# Patient Record
Sex: MALE | Race: BLACK OR AFRICAN AMERICAN | URBAN - METROPOLITAN AREA
[De-identification: names, ages, dates, MRNs, and addresses within clinical notes are randomized per-mention and may not be internally consistent; named-entity substitution may affect disease eponyms.]

---

## 2018-05-21 VITALS
DIASTOLIC BLOOD PRESSURE: 75 MMHG | HEART RATE: 96 BPM | SYSTOLIC BLOOD PRESSURE: 121 MMHG | OXYGEN SATURATION: 95 % | TEMPERATURE: 98 F | HEIGHT: 71 IN | RESPIRATION RATE: 19 BRPM | WEIGHT: 212.08 LBS

## 2018-05-21 LAB
BASOPHILS NFR BLD AUTO: 0.2 % — SIGNIFICANT CHANGE UP (ref 0–2)
EOSINOPHIL NFR BLD AUTO: 2.9 % — SIGNIFICANT CHANGE UP (ref 0–6)
HCT VFR BLD CALC: 34.8 % — LOW (ref 39–50)
HGB BLD-MCNC: 11.3 G/DL — LOW (ref 13–17)
LYMPHOCYTES # BLD AUTO: 24.7 % — SIGNIFICANT CHANGE UP (ref 13–44)
MCHC RBC-ENTMCNC: 30.2 PG — SIGNIFICANT CHANGE UP (ref 27–34)
MCHC RBC-ENTMCNC: 32.5 G/DL — SIGNIFICANT CHANGE UP (ref 32–36)
MCV RBC AUTO: 93 FL — SIGNIFICANT CHANGE UP (ref 80–100)
MONOCYTES NFR BLD AUTO: 11.6 % — SIGNIFICANT CHANGE UP (ref 2–14)
NEUTROPHILS NFR BLD AUTO: 60.6 % — SIGNIFICANT CHANGE UP (ref 43–77)
PLATELET # BLD AUTO: 313 K/UL — SIGNIFICANT CHANGE UP (ref 150–400)
RBC # BLD: 3.74 M/UL — LOW (ref 4.2–5.8)
RBC # FLD: 14.1 % — SIGNIFICANT CHANGE UP (ref 10.3–16.9)
WBC # BLD: 8.4 K/UL — SIGNIFICANT CHANGE UP (ref 3.8–10.5)
WBC # FLD AUTO: 8.4 K/UL — SIGNIFICANT CHANGE UP (ref 3.8–10.5)

## 2018-05-21 PROCEDURE — 99285 EMERGENCY DEPT VISIT HI MDM: CPT

## 2018-05-22 ENCOUNTER — INPATIENT (INPATIENT)
Facility: HOSPITAL | Age: 73
LOS: 13 days | Discharge: NURSING, REHAB OR LONG-TERM CARE | DRG: 405 | End: 2018-06-05
Attending: SURGERY | Admitting: SURGERY
Payer: COMMERCIAL

## 2018-05-22 DIAGNOSIS — Z01.818 ENCOUNTER FOR OTHER PREPROCEDURAL EXAMINATION: ICD-10-CM

## 2018-05-22 DIAGNOSIS — Z21 ASYMPTOMATIC HUMAN IMMUNODEFICIENCY VIRUS [HIV] INFECTION STATUS: ICD-10-CM

## 2018-05-22 DIAGNOSIS — E78.5 HYPERLIPIDEMIA, UNSPECIFIED: ICD-10-CM

## 2018-05-22 DIAGNOSIS — K86.3 PSEUDOCYST OF PANCREAS: ICD-10-CM

## 2018-05-22 DIAGNOSIS — R79.1 ABNORMAL COAGULATION PROFILE: ICD-10-CM

## 2018-05-22 DIAGNOSIS — I10 ESSENTIAL (PRIMARY) HYPERTENSION: ICD-10-CM

## 2018-05-22 DIAGNOSIS — I50.22 CHRONIC SYSTOLIC (CONGESTIVE) HEART FAILURE: ICD-10-CM

## 2018-05-22 LAB
ALBUMIN SERPL ELPH-MCNC: 3 G/DL — LOW (ref 3.3–5)
ALBUMIN SERPL ELPH-MCNC: 3.4 G/DL — SIGNIFICANT CHANGE UP (ref 3.3–5)
ALP SERPL-CCNC: 80 U/L — SIGNIFICANT CHANGE UP (ref 40–120)
ALP SERPL-CCNC: SIGNIFICANT CHANGE UP U/L (ref 40–120)
ALT FLD-CCNC: 22 U/L — SIGNIFICANT CHANGE UP (ref 10–45)
ALT FLD-CCNC: SIGNIFICANT CHANGE UP U/L (ref 10–45)
ANION GAP SERPL CALC-SCNC: 13 MMOL/L — SIGNIFICANT CHANGE UP (ref 5–17)
ANION GAP SERPL CALC-SCNC: 13 MMOL/L — SIGNIFICANT CHANGE UP (ref 5–17)
APPEARANCE UR: CLEAR — SIGNIFICANT CHANGE UP
APTT BLD: 26.5 SEC — LOW (ref 27.5–37.4)
AST SERPL-CCNC: 22 U/L — SIGNIFICANT CHANGE UP (ref 10–40)
AST SERPL-CCNC: SIGNIFICANT CHANGE UP U/L (ref 10–40)
BACTERIA # UR AUTO: PRESENT /HPF
BILIRUB SERPL-MCNC: 0.4 MG/DL — SIGNIFICANT CHANGE UP (ref 0.2–1.2)
BILIRUB SERPL-MCNC: 0.4 MG/DL — SIGNIFICANT CHANGE UP (ref 0.2–1.2)
BILIRUB UR-MCNC: NEGATIVE — SIGNIFICANT CHANGE UP
BUN SERPL-MCNC: 14 MG/DL — SIGNIFICANT CHANGE UP (ref 7–23)
BUN SERPL-MCNC: 16 MG/DL — SIGNIFICANT CHANGE UP (ref 7–23)
CALCIUM SERPL-MCNC: 8.7 MG/DL — SIGNIFICANT CHANGE UP (ref 8.4–10.5)
CALCIUM SERPL-MCNC: 9.2 MG/DL — SIGNIFICANT CHANGE UP (ref 8.4–10.5)
CHLORIDE SERPL-SCNC: 92 MMOL/L — LOW (ref 96–108)
CHLORIDE SERPL-SCNC: 94 MMOL/L — LOW (ref 96–108)
CO2 SERPL-SCNC: 28 MMOL/L — SIGNIFICANT CHANGE UP (ref 22–31)
CO2 SERPL-SCNC: 28 MMOL/L — SIGNIFICANT CHANGE UP (ref 22–31)
COLOR SPEC: YELLOW — SIGNIFICANT CHANGE UP
CREAT SERPL-MCNC: 1.15 MG/DL — SIGNIFICANT CHANGE UP (ref 0.5–1.3)
CREAT SERPL-MCNC: 1.19 MG/DL — SIGNIFICANT CHANGE UP (ref 0.5–1.3)
DIFF PNL FLD: NEGATIVE — SIGNIFICANT CHANGE UP
GLUCOSE SERPL-MCNC: 100 MG/DL — HIGH (ref 70–99)
GLUCOSE SERPL-MCNC: 104 MG/DL — HIGH (ref 70–99)
GLUCOSE UR QL: NEGATIVE — SIGNIFICANT CHANGE UP
INR BLD: 1.38 — HIGH (ref 0.88–1.16)
KETONES UR-MCNC: NEGATIVE — SIGNIFICANT CHANGE UP
LEUKOCYTE ESTERASE UR-ACNC: NEGATIVE — SIGNIFICANT CHANGE UP
LIDOCAIN IGE QN: 203 U/L — HIGH (ref 7–60)
NITRITE UR-MCNC: NEGATIVE — SIGNIFICANT CHANGE UP
PH UR: 5.5 — SIGNIFICANT CHANGE UP (ref 5–8)
POTASSIUM SERPL-MCNC: 4.2 MMOL/L — SIGNIFICANT CHANGE UP (ref 3.5–5.3)
POTASSIUM SERPL-MCNC: SIGNIFICANT CHANGE UP MMOL/L (ref 3.5–5.3)
POTASSIUM SERPL-SCNC: 4.2 MMOL/L — SIGNIFICANT CHANGE UP (ref 3.5–5.3)
POTASSIUM SERPL-SCNC: SIGNIFICANT CHANGE UP MMOL/L (ref 3.5–5.3)
PROT SERPL-MCNC: 7.6 G/DL — SIGNIFICANT CHANGE UP (ref 6–8.3)
PROT SERPL-MCNC: 8.7 G/DL — HIGH (ref 6–8.3)
PROT UR-MCNC: (no result) MG/DL
PROTHROM AB SERPL-ACNC: 15.4 SEC — HIGH (ref 9.8–12.7)
RBC CASTS # UR COMP ASSIST: < 5 /HPF — SIGNIFICANT CHANGE UP
SODIUM SERPL-SCNC: 133 MMOL/L — LOW (ref 135–145)
SODIUM SERPL-SCNC: 135 MMOL/L — SIGNIFICANT CHANGE UP (ref 135–145)
SP GR SPEC: 1.01 — SIGNIFICANT CHANGE UP (ref 1–1.03)
UROBILINOGEN FLD QL: 1 E.U./DL — SIGNIFICANT CHANGE UP
WBC UR QL: < 5 /HPF — SIGNIFICANT CHANGE UP

## 2018-05-22 PROCEDURE — 99222 1ST HOSP IP/OBS MODERATE 55: CPT

## 2018-05-22 PROCEDURE — 93010 ELECTROCARDIOGRAM REPORT: CPT

## 2018-05-22 PROCEDURE — 71045 X-RAY EXAM CHEST 1 VIEW: CPT | Mod: 26

## 2018-05-22 PROCEDURE — 74177 CT ABD & PELVIS W/CONTRAST: CPT | Mod: 26

## 2018-05-22 RX ORDER — HEPARIN SODIUM 5000 [USP'U]/ML
5000 INJECTION INTRAVENOUS; SUBCUTANEOUS EVERY 8 HOURS
Qty: 0 | Refills: 0 | Status: DISCONTINUED | OUTPATIENT
Start: 2018-05-22 | End: 2018-05-23

## 2018-05-22 RX ORDER — LIPASE/PROTEASE/AMYLASE 16-48-48K
1 CAPSULE,DELAYED RELEASE (ENTERIC COATED) ORAL
Qty: 0 | Refills: 0 | COMMUNITY

## 2018-05-22 RX ORDER — DOCUSATE SODIUM 100 MG
3 CAPSULE ORAL
Qty: 0 | Refills: 0 | COMMUNITY

## 2018-05-22 RX ORDER — ONDANSETRON 8 MG/1
4 TABLET, FILM COATED ORAL ONCE
Qty: 0 | Refills: 0 | Status: COMPLETED | OUTPATIENT
Start: 2018-05-22 | End: 2018-05-22

## 2018-05-22 RX ORDER — LIPASE/PROTEASE/AMYLASE 16-48-48K
1 CAPSULE,DELAYED RELEASE (ENTERIC COATED) ORAL
Qty: 0 | Refills: 0 | Status: DISCONTINUED | OUTPATIENT
Start: 2018-05-22 | End: 2018-05-23

## 2018-05-22 RX ORDER — ONDANSETRON 8 MG/1
4 TABLET, FILM COATED ORAL EVERY 6 HOURS
Qty: 0 | Refills: 0 | Status: DISCONTINUED | OUTPATIENT
Start: 2018-05-22 | End: 2018-05-23

## 2018-05-22 RX ORDER — INFLUENZA VIRUS VACCINE 15; 15; 15; 15 UG/.5ML; UG/.5ML; UG/.5ML; UG/.5ML
0.5 SUSPENSION INTRAMUSCULAR ONCE
Qty: 0 | Refills: 0 | Status: COMPLETED | OUTPATIENT
Start: 2018-05-22 | End: 2018-05-22

## 2018-05-22 RX ORDER — GABAPENTIN 400 MG/1
300 CAPSULE ORAL THREE TIMES A DAY
Qty: 0 | Refills: 0 | Status: DISCONTINUED | OUTPATIENT
Start: 2018-05-23 | End: 2018-05-23

## 2018-05-22 RX ORDER — EMTRICITABINE, RILPIVIRINE HYDROCHLORIDE, AND TENOFOVIR DISOPROXIL FUMARATE 200; 25; 300 MG/1; MG/1; MG/1
1 TABLET, FILM COATED ORAL
Qty: 0 | Refills: 0 | COMMUNITY

## 2018-05-22 RX ORDER — SODIUM CHLORIDE 9 MG/ML
1000 INJECTION INTRAMUSCULAR; INTRAVENOUS; SUBCUTANEOUS ONCE
Qty: 0 | Refills: 0 | Status: COMPLETED | OUTPATIENT
Start: 2018-05-22 | End: 2018-05-22

## 2018-05-22 RX ORDER — HYDROMORPHONE HYDROCHLORIDE 2 MG/ML
0.25 INJECTION INTRAMUSCULAR; INTRAVENOUS; SUBCUTANEOUS EVERY 4 HOURS
Qty: 0 | Refills: 0 | Status: DISCONTINUED | OUTPATIENT
Start: 2018-05-22 | End: 2018-05-23

## 2018-05-22 RX ORDER — MORPHINE SULFATE 50 MG/1
4 CAPSULE, EXTENDED RELEASE ORAL ONCE
Qty: 0 | Refills: 0 | Status: DISCONTINUED | OUTPATIENT
Start: 2018-05-22 | End: 2018-05-22

## 2018-05-22 RX ORDER — IOHEXOL 300 MG/ML
50 INJECTION, SOLUTION INTRAVENOUS ONCE
Qty: 0 | Refills: 0 | Status: COMPLETED | OUTPATIENT
Start: 2018-05-22 | End: 2018-05-22

## 2018-05-22 RX ORDER — SODIUM CHLORIDE 9 MG/ML
1000 INJECTION INTRAMUSCULAR; INTRAVENOUS; SUBCUTANEOUS
Qty: 0 | Refills: 0 | Status: DISCONTINUED | OUTPATIENT
Start: 2018-05-22 | End: 2018-05-23

## 2018-05-22 RX ORDER — DOLUTEGRAVIR SODIUM 25 MG/1
1 TABLET, FILM COATED ORAL
Qty: 0 | Refills: 0 | COMMUNITY

## 2018-05-22 RX ORDER — EMTRICITABINE, RILPIVIRINE HYDROCHLORIDE, AND TENOFOVIR DISOPROXIL FUMARATE 200; 25; 300 MG/1; MG/1; MG/1
1 TABLET, FILM COATED ORAL DAILY
Qty: 0 | Refills: 0 | Status: DISCONTINUED | OUTPATIENT
Start: 2018-05-22 | End: 2018-05-23

## 2018-05-22 RX ORDER — ATORVASTATIN CALCIUM 80 MG/1
1 TABLET, FILM COATED ORAL
Qty: 0 | Refills: 0 | COMMUNITY

## 2018-05-22 RX ORDER — DOLUTEGRAVIR SODIUM 25 MG/1
50 TABLET, FILM COATED ORAL DAILY
Qty: 0 | Refills: 0 | Status: DISCONTINUED | OUTPATIENT
Start: 2018-05-22 | End: 2018-05-23

## 2018-05-22 RX ORDER — HYDROMORPHONE HYDROCHLORIDE 2 MG/ML
0.5 INJECTION INTRAMUSCULAR; INTRAVENOUS; SUBCUTANEOUS EVERY 4 HOURS
Qty: 0 | Refills: 0 | Status: DISCONTINUED | OUTPATIENT
Start: 2018-05-22 | End: 2018-05-23

## 2018-05-22 RX ORDER — DOLUTEGRAVIR SODIUM 25 MG/1
50 TABLET, FILM COATED ORAL DAILY
Qty: 0 | Refills: 0 | Status: DISCONTINUED | OUTPATIENT
Start: 2018-05-22 | End: 2018-05-22

## 2018-05-22 RX ADMIN — DOLUTEGRAVIR SODIUM 50 MILLIGRAM(S): 25 TABLET, FILM COATED ORAL at 11:17

## 2018-05-22 RX ADMIN — SODIUM CHLORIDE 125 MILLILITER(S): 9 INJECTION INTRAMUSCULAR; INTRAVENOUS; SUBCUTANEOUS at 05:58

## 2018-05-22 RX ADMIN — MORPHINE SULFATE 4 MILLIGRAM(S): 50 CAPSULE, EXTENDED RELEASE ORAL at 00:27

## 2018-05-22 RX ADMIN — IOHEXOL 50 MILLILITER(S): 300 INJECTION, SOLUTION INTRAVENOUS at 00:27

## 2018-05-22 RX ADMIN — HYDROMORPHONE HYDROCHLORIDE 0.5 MILLIGRAM(S): 2 INJECTION INTRAMUSCULAR; INTRAVENOUS; SUBCUTANEOUS at 12:15

## 2018-05-22 RX ADMIN — MORPHINE SULFATE 4 MILLIGRAM(S): 50 CAPSULE, EXTENDED RELEASE ORAL at 00:57

## 2018-05-22 RX ADMIN — HEPARIN SODIUM 5000 UNIT(S): 5000 INJECTION INTRAVENOUS; SUBCUTANEOUS at 11:15

## 2018-05-22 RX ADMIN — SODIUM CHLORIDE 175 MILLILITER(S): 9 INJECTION INTRAMUSCULAR; INTRAVENOUS; SUBCUTANEOUS at 21:50

## 2018-05-22 RX ADMIN — HYDROMORPHONE HYDROCHLORIDE 0.5 MILLIGRAM(S): 2 INJECTION INTRAMUSCULAR; INTRAVENOUS; SUBCUTANEOUS at 11:17

## 2018-05-22 RX ADMIN — SODIUM CHLORIDE 1000 MILLILITER(S): 9 INJECTION INTRAMUSCULAR; INTRAVENOUS; SUBCUTANEOUS at 00:26

## 2018-05-22 RX ADMIN — HEPARIN SODIUM 5000 UNIT(S): 5000 INJECTION INTRAVENOUS; SUBCUTANEOUS at 21:50

## 2018-05-22 RX ADMIN — ONDANSETRON 4 MILLIGRAM(S): 8 TABLET, FILM COATED ORAL at 00:27

## 2018-05-22 RX ADMIN — SODIUM CHLORIDE 175 MILLILITER(S): 9 INJECTION INTRAMUSCULAR; INTRAVENOUS; SUBCUTANEOUS at 11:11

## 2018-05-22 RX ADMIN — HYDROMORPHONE HYDROCHLORIDE 0.5 MILLIGRAM(S): 2 INJECTION INTRAMUSCULAR; INTRAVENOUS; SUBCUTANEOUS at 18:21

## 2018-05-22 RX ADMIN — EMTRICITABINE, RILPIVIRINE HYDROCHLORIDE, AND TENOFOVIR DISOPROXIL FUMARATE 1 TABLET(S): 200; 25; 300 TABLET, FILM COATED ORAL at 12:57

## 2018-05-22 RX ADMIN — Medication 1 CAPSULE(S): at 12:56

## 2018-05-22 NOTE — ED ADULT NURSE REASSESSMENT NOTE - NS ED NURSE REASSESS COMMENT FT1
Received pt from Gurpreet MCLEOD. Pending surgery consult. Pt reports lower back pain at this time, sleeping/resting presently. Will continue to monitor.

## 2018-05-22 NOTE — CONSULT NOTE ADULT - SUBJECTIVE AND OBJECTIVE BOX
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 	Tivicay 50 mg oral tablet: 1 tab(s) orally once a day      Past Medical History: HIV, chronic pancreatitis with insufficiency, HTN, HLD, bilateral cataracts    Surgical History: surgery for lumbar spinal stenosis, endoscopic and surgical cystgastrostomy    Family History: no pertinent family history     Social History: social etoh use, denies drugs of abuse and denies current smoking history, prior 20 pack year smoking history     REVIEW OF SYSTEMS:  CONSTITUTIONAL: No fever, weight loss,   EYES: No eye pain, visual disturbances, or discharge  ENMT:  No difficulty hearing, tinnitus, vertigo; No sinus or throat pain  NECK: No pain or stiffness  RESPIRATORY: as per HPI  CARDIOVASCULAR: No chest pain, palpitations, dizziness, or leg swelling  GASTROINTESTINAL: 7/10 dull abdominal pain  GENITOURINARY: No dysuria, frequency, hematuria, or incontinence  NEUROLOGICAL: No headaches, memory loss, loss of strength, numbness, or tremors  SKIN: No itching, burning, rashes, or lesions   LYMPH NODES: No enlarged glands  ENDOCRINE: No heat or cold intolerance; No hair loss  MUSCULOSKELETAL: No joint pain or swelling; No muscle, back, or extremity pain  PSYCHIATRIC: No depression, anxiety, mood swings, or difficulty sleeping  HEME/LYMPH: No easy bruising, or bleeding gums  ALLERY AND IMMUNOLOGIC: No hives or eczema    T(C): 36.6 (18 @ 10:34), Max: 36.9 (18 @ 09:39)  HR: 74 (18 @ 10:34) (61 - 96)  BP: 126/71 (18 @ 10:34) (106/62 - 134/74)  RR: 17 (18 @ 10:34) (17 - 19)  SpO2: 96% (18 @ 10:34) (95% - 96%)  Wt(kg): --Vital Signs Last 24 Hrs  T(C): 36.6 (22 May 2018 10:34), Max: 36.9 (22 May 2018 09:39)  T(F): 97.8 (22 May 2018 10:34), Max: 98.4 (22 May 2018 09:39)  HR: 74 (22 May 2018 10:34) (61 - 96)  BP: 126/71 (22 May 2018 10:34) (106/62 - 134/74)  BP(mean): --  RR: 17 (22 May 2018 10:34) (17 - 19)  SpO2: 96% (22 May 2018 10:34) (95% - 96%)    PHYSICAL EXAM:  GENERAL: NAD, well-groomed, well-developed, resting comfortably in bed  HEAD:  Atraumatic, Normocephalic  EYES: EOMI, PERRLA, conjunctiva and sclera clear  ENMT: No tonsillar erythema, exudates, or enlargement; Moist mucous membranes, Good dentition, No lesions  NECK: Supple, No JVD,   NERVOUS SYSTEM:  Alert & Oriented X3, Good concentration; Motor Strength 5/5 B/L upper and lower extremities;    CHEST/LUNG: Clear to percussion bilaterally; No rales, rhonchi, wheezing, or rubs  HEART: Regular rate and rhythm; No murmurs, rubs, or gallops  ABDOMEN: Soft, Nontender, Nondistended; tenderness to palpation along the upper and lower right quadrants, non-tender left upper and lower quandrants   EXTREMITIES:  2+ Peripheral Pulses, compression socks in place - no LE edema  LYMPH: No lymphadenopathy noted  SKIN: No rashes or lesions    Consultant(s) Notes Reviewed:  [x ] YES  [ ] NO  Care Discussed with Consultants/Other Providers [ x] YES  [ ] NO    LABS:                        11.3   8.4   )-----------( 313      ( 21 May 2018 23:31 )             34.8     05-    135  |  94<L>  |  14  ----------------------------<  100<H>  4.2   |  28  |  1.19    Ca    8.7      22 May 2018 04:20    TPro  7.6  /  Alb  3.0<L>  /  TBili  0.4  /  DBili  x   /  AST  22  /  ALT  22  /  AlkPhos  80  05-    PT/INR - ( 21 May 2018 23:31 )   PT: 15.4 sec;   INR: 1.38          PTT - ( 21 May 2018 23:31 )  PTT:26.5 sec  Urinalysis Basic - ( 22 May 2018 01:34 )    Color: Yellow / Appearance: Clear / S.015 / pH: x  Gluc: x / Ketone: NEGATIVE  / Bili: Negative / Urobili: 1.0 E.U./dL   Blood: x / Protein: Trace mg/dL / Nitrite: NEGATIVE   Leuk Esterase: NEGATIVE / RBC: < 5 /HPF / WBC < 5 /HPF   Sq Epi: x / Non Sq Epi: x / Bacteria: Present /HPF      CAPILLARY BLOOD GLUCOSE    Urinalysis Basic - ( 22 May 2018 01:34 )    Color: Yellow / Appearance: Clear / S.015 / pH: x  Gluc: x / Ketone: NEGATIVE  / Bili: Negative / Urobili: 1.0 E.U./dL   Blood: x / Protein: Trace mg/dL / Nitrite: NEGATIVE   Leuk Esterase: NEGATIVE / RBC: < 5 /HPF / WBC < 5 /HPF   Sq Epi: x / Non Sq Epi: x / Bacteria: Present /HPF    RADIOLOGY & ADDITIONAL TESTS:    Imaging Personally Reviewed:  [X ] YES  [ ] NO
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
## 2018-05-22 NOTE — CONSULT NOTE ADULT - PROBLEM SELECTOR RECOMMENDATION 2
unclear etiology, possibly vitamin K deficiency  -recommend mixing studies and repeat INR/PT in the am unclear etiology, possibly vitamin K deficiency vs factor deficiency  -recommend mixing studies and repeat INR/PT in the am

## 2018-05-22 NOTE — CONSULT NOTE ADULT - ATTENDING COMMENTS
Pt seen and examined, VS reviewed, agree with above assessment and plan as per Dr Alexander and as d/w pt and surgery team
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
## 2018-05-22 NOTE — ED ADULT NURSE NOTE - PMH
Asymptomatic human immunodeficiency virus infection  HIV (human immunodeficiency virus infection)  Cataract  Cataracts, bilateral  Chronic pancreatitis  Chronic pancreatitis

## 2018-05-22 NOTE — ED PROVIDER NOTE - MEDICAL DECISION MAKING DETAILS
pt with abd pain, nausea, hx of panceatitis, pe - (+) left upper quad tenderness, labs show elevated lipase, ct with pancreatitis with pseudocyst larger than prior, seen by surgery for possible drainage - pending dispo - waiting for Dr. Starr to see pt in ED.

## 2018-05-22 NOTE — CONSULT NOTE ADULT - PROBLEM SELECTOR RECOMMENDATION 5
-would c/w home statin dose - pt may need a high dose statin based on his cardiac history but would recommend out pt follow up unless obtain lipid profile as part of additional work up -would c/w home statin dose - pt may need a high dose statin based on his cardiac history  - can obtain fasting lipid profile as part of additional work up

## 2018-05-22 NOTE — H&P ADULT - NSHPPHYSICALEXAM_GEN_ALL_CORE
General: NAD, resting comfortably  HEENT: NC/AT, EOMI, normal hearing, no oral lesions, no LAD, neck supple  Pulmonary: normal resp effort, CTA-B  Cardiovascular: NSR, no murmurs  Abdominal: soft, ND, TTP LUQ, minimally tender in LLQ, no organomegaly, +BS  Back: Left CVA tenderness, vertebrae midline, negative straight leg raise test  Extremities: WWP, normal strength, no clubbing/cyanosis, trace non-pitting edema bilaterally in lower legs  Neuro: A/O x 3, CNs II-XII grossly intact, normal sensation, no focal deficits

## 2018-05-22 NOTE — CONSULT NOTE ADULT - ASSESSMENT
73 yo M with pmhx of HIV (last CD4 651, viral load undetectable), sCHF w/EF of 38%, PVD, HTN and chronic pancreatitis with pancreatic insufficiency who presented with 2 weeks of worsening sharp left back pain radiating to LUQ abdominal pain admitted for enlarging pancreatic pseudocyst medicine consult called for preoperative clearance 73 yo M with pmhx of HIV (last CD4 651, viral load undetectable), sCHF w/EF of 38%, PVD, HTN and chronic pancreatitis with pancreatic insufficiency who presented with 2 weeks of worsening sharp left back pain radiating to LUQ abdominal pain admitted for enlarging pancreatic pseudocyst and possible surgical resection, medicine consult called for preoperative clearance.

## 2018-05-22 NOTE — ED ADULT NURSE NOTE - CHIEF COMPLAINT QUOTE
Pt seen at [facility] sent for R/O PANCREATITIS after 2 wks LEFT SIDED ABD PN

## 2018-05-22 NOTE — CONSULT NOTE ADULT - PROBLEM SELECTOR RECOMMENDATION 6
-normotensive off of medications, takes HTZ at home, would start HF medications after surgery as above *would not start these medications preoperatively* -normotensive off of home medications  will make further BP med recs post op based on echo results
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
## 2018-05-22 NOTE — CONSULT NOTE ADULT - PROBLEM SELECTOR RECOMMENDATION 7
stable, pt is compliant with medications as outpt   -c/w Complera and Tivicay  -plan for outpt follow up    Medicine will continue to follow stable, pt is compliant with medications as outpt   -c/w Complera and Tivicay  given lack of documented viral load within the past year would obtain CD4 count and viral load prior to surgery  -would obtain collateral from Dr. Padilla who manages the patient's HIV  -plan for outpt follow up    Medicine will continue to follow

## 2018-05-22 NOTE — ED ADULT NURSE NOTE - OBJECTIVE STATEMENT
Pt presents to ED C/O LLQ abd pain x 2 weeks, worsening today. Pt also c/o nausea without vomiting. Last BM 4 days ago.

## 2018-05-22 NOTE — CONSULT NOTE ADULT - PROBLEM SELECTOR RECOMMENDATION 3
would repeat echocardiogram as above  -given complaints of LE edema, would consider lasix as outpt (pt reports taking HTZ at home), currently normotensive and w/o edema off of HTZ  -recommend discharge plan to start HF medications, low dose BB and ACE, NYHA class III given severe exercise intolerance  -would repeat echocardiogram as above and obtain collateral from patient's PCP  -given complaints of LE edema, would consider lasix as outpt given CHF history (pt reports taking HTZ at home), currently normotensive and w/o edema off of HTZ  -recommend discharge plan to start HF medications, low dose BB and ACE as BP tolerated w/plan for outpt follow up NYHA class III given severe exercise intolerance  -would repeat echocardiogram as above and obtain collateral from patient's PCP  -given complaints of LE edema, would consider lasix as outpt given CHF history (pt reports taking HTZ at home), currently normotensive and w/o edema off of HTZ  -recommend discharge plan to start CHF medications, low dose BB and ACE as BP tolerated w/plan for outpt follow up- will make further recs s/p echo

## 2018-05-23 ENCOUNTER — RESULT REVIEW (OUTPATIENT)
Age: 73
End: 2018-05-23

## 2018-05-23 LAB
ALBUMIN SERPL ELPH-MCNC: 3.1 G/DL — LOW (ref 3.3–5)
ALP SERPL-CCNC: 82 U/L — SIGNIFICANT CHANGE UP (ref 40–120)
ALT FLD-CCNC: 25 U/L — SIGNIFICANT CHANGE UP (ref 10–45)
ANION GAP SERPL CALC-SCNC: 10 MMOL/L — SIGNIFICANT CHANGE UP (ref 5–17)
ANION GAP SERPL CALC-SCNC: 18 MMOL/L — HIGH (ref 5–17)
APTT BLD: 26.1 SEC — LOW (ref 27.5–37.4)
AST SERPL-CCNC: 27 U/L — SIGNIFICANT CHANGE UP (ref 10–40)
BASE EXCESS BLDA CALC-SCNC: -2.2 MMOL/L — LOW (ref -2–3)
BASOPHILS NFR BLD AUTO: 0.1 % — SIGNIFICANT CHANGE UP (ref 0–2)
BILIRUB SERPL-MCNC: 0.3 MG/DL — SIGNIFICANT CHANGE UP (ref 0.2–1.2)
BUN SERPL-MCNC: 12 MG/DL — SIGNIFICANT CHANGE UP (ref 7–23)
BUN SERPL-MCNC: 13 MG/DL — SIGNIFICANT CHANGE UP (ref 7–23)
CA-I BLDA-SCNC: 0.98 MMOL/L — LOW (ref 1.12–1.3)
CALCIUM SERPL-MCNC: 8.3 MG/DL — LOW (ref 8.4–10.5)
CALCIUM SERPL-MCNC: 8.7 MG/DL — SIGNIFICANT CHANGE UP (ref 8.4–10.5)
CHLORIDE SERPL-SCNC: 102 MMOL/L — SIGNIFICANT CHANGE UP (ref 96–108)
CHLORIDE SERPL-SCNC: 103 MMOL/L — SIGNIFICANT CHANGE UP (ref 96–108)
CO2 SERPL-SCNC: 20 MMOL/L — LOW (ref 22–31)
CO2 SERPL-SCNC: 25 MMOL/L — SIGNIFICANT CHANGE UP (ref 22–31)
COHGB MFR BLDA: 0.2 % — SIGNIFICANT CHANGE UP
CREAT SERPL-MCNC: 1 MG/DL — SIGNIFICANT CHANGE UP (ref 0.5–1.3)
CREAT SERPL-MCNC: 1.09 MG/DL — SIGNIFICANT CHANGE UP (ref 0.5–1.3)
CULTURE RESULTS: NO GROWTH — SIGNIFICANT CHANGE UP
EOSINOPHIL NFR BLD AUTO: 0.1 % — SIGNIFICANT CHANGE UP (ref 0–6)
FIBRINOGEN PPP-MCNC: 1092 MG/DL — HIGH (ref 310–510)
GAS PNL BLDA: SIGNIFICANT CHANGE UP
GLUCOSE BLDC GLUCOMTR-MCNC: 114 MG/DL — HIGH (ref 70–99)
GLUCOSE SERPL-MCNC: 115 MG/DL — HIGH (ref 70–99)
GLUCOSE SERPL-MCNC: 153 MG/DL — HIGH (ref 70–99)
HCO3 BLDA-SCNC: 23 MMOL/L — SIGNIFICANT CHANGE UP (ref 21–28)
HCT VFR BLD CALC: 29 % — LOW (ref 39–50)
HCT VFR BLD CALC: 32.4 % — LOW (ref 39–50)
HGB BLD-MCNC: 10.3 G/DL — LOW (ref 13–17)
HGB BLD-MCNC: 9.3 G/DL — LOW (ref 13–17)
HGB BLDA-MCNC: 12.2 G/DL — LOW (ref 13–17)
HIV-1 VIRAL LOAD RESULT: SIGNIFICANT CHANGE UP
HIV1 RNA # SERPL NAA+PROBE: SIGNIFICANT CHANGE UP
HIV1 RNA SER-IMP: SIGNIFICANT CHANGE UP
HIV1 RNA SERPL NAA+PROBE-ACNC: SIGNIFICANT CHANGE UP
HIV1 RNA SERPL NAA+PROBE-LOG#: SIGNIFICANT CHANGE UP LG COP/ML
INR BLD: 1.3 — HIGH (ref 0.88–1.16)
LIDOCAIN IGE QN: 188 U/L — HIGH (ref 7–60)
LYMPHOCYTES # BLD AUTO: 6.4 % — LOW (ref 13–44)
MAGNESIUM SERPL-MCNC: 1.9 MG/DL — SIGNIFICANT CHANGE UP (ref 1.6–2.6)
MAGNESIUM SERPL-MCNC: 2.3 MG/DL — SIGNIFICANT CHANGE UP (ref 1.6–2.6)
MCHC RBC-ENTMCNC: 30.2 PG — SIGNIFICANT CHANGE UP (ref 27–34)
MCHC RBC-ENTMCNC: 30.2 PG — SIGNIFICANT CHANGE UP (ref 27–34)
MCHC RBC-ENTMCNC: 31.8 G/DL — LOW (ref 32–36)
MCHC RBC-ENTMCNC: 32.1 G/DL — SIGNIFICANT CHANGE UP (ref 32–36)
MCV RBC AUTO: 94.2 FL — SIGNIFICANT CHANGE UP (ref 80–100)
MCV RBC AUTO: 95 FL — SIGNIFICANT CHANGE UP (ref 80–100)
METHGB MFR BLDA: 0.4 % — SIGNIFICANT CHANGE UP
MONOCYTES NFR BLD AUTO: 8.5 % — SIGNIFICANT CHANGE UP (ref 2–14)
NEUTROPHILS NFR BLD AUTO: 84.9 % — HIGH (ref 43–77)
O2 CT VFR BLDA CALC: 17.4 ML/DL — SIGNIFICANT CHANGE UP (ref 15–23)
OXYHGB MFR BLDA: 99 % — SIGNIFICANT CHANGE UP (ref 94–100)
PCO2 BLDA: 40 MMHG — SIGNIFICANT CHANGE UP (ref 35–48)
PH BLDA: 7.38 — SIGNIFICANT CHANGE UP (ref 7.35–7.45)
PHOSPHATE SERPL-MCNC: 2.7 MG/DL — SIGNIFICANT CHANGE UP (ref 2.5–4.5)
PHOSPHATE SERPL-MCNC: 3.5 MG/DL — SIGNIFICANT CHANGE UP (ref 2.5–4.5)
PLATELET # BLD AUTO: 293 K/UL — SIGNIFICANT CHANGE UP (ref 150–400)
PLATELET # BLD AUTO: 306 K/UL — SIGNIFICANT CHANGE UP (ref 150–400)
PO2 BLDA: 198 MMHG — HIGH (ref 83–108)
POTASSIUM BLDA-SCNC: 4.1 MMOL/L — SIGNIFICANT CHANGE UP (ref 3.5–4.9)
POTASSIUM SERPL-MCNC: 4.3 MMOL/L — SIGNIFICANT CHANGE UP (ref 3.5–5.3)
POTASSIUM SERPL-MCNC: 4.7 MMOL/L — SIGNIFICANT CHANGE UP (ref 3.5–5.3)
POTASSIUM SERPL-SCNC: 4.3 MMOL/L — SIGNIFICANT CHANGE UP (ref 3.5–5.3)
POTASSIUM SERPL-SCNC: 4.7 MMOL/L — SIGNIFICANT CHANGE UP (ref 3.5–5.3)
PROT SERPL-MCNC: 7.5 G/DL — SIGNIFICANT CHANGE UP (ref 6–8.3)
PROTHROM AB SERPL-ACNC: 14.5 SEC — HIGH (ref 9.8–12.7)
PT 50/50: 12.3 SECS — SIGNIFICANT CHANGE UP (ref 9.7–13.5)
RBC # BLD: 3.08 M/UL — LOW (ref 4.2–5.8)
RBC # BLD: 3.41 M/UL — LOW (ref 4.2–5.8)
RBC # FLD: 14 % — SIGNIFICANT CHANGE UP (ref 10.3–16.9)
RBC # FLD: 14.1 % — SIGNIFICANT CHANGE UP (ref 10.3–16.9)
SAO2 % BLDA: 99 % — SIGNIFICANT CHANGE UP (ref 95–100)
SODIUM BLDA-SCNC: 136 MMOL/L — LOW (ref 138–146)
SODIUM SERPL-SCNC: 138 MMOL/L — SIGNIFICANT CHANGE UP (ref 135–145)
SODIUM SERPL-SCNC: 140 MMOL/L — SIGNIFICANT CHANGE UP (ref 135–145)
SPECIMEN SOURCE: SIGNIFICANT CHANGE UP
THROMBIN TIME: 19.3 SEC — SIGNIFICANT CHANGE UP (ref 17.6–24)
WBC # BLD: 10.4 K/UL — SIGNIFICANT CHANGE UP (ref 3.8–10.5)
WBC # BLD: 6 K/UL — SIGNIFICANT CHANGE UP (ref 3.8–10.5)
WBC # FLD AUTO: 10.4 K/UL — SIGNIFICANT CHANGE UP (ref 3.8–10.5)
WBC # FLD AUTO: 6 K/UL — SIGNIFICANT CHANGE UP (ref 3.8–10.5)

## 2018-05-23 PROCEDURE — 93306 TTE W/DOPPLER COMPLETE: CPT | Mod: 26

## 2018-05-23 PROCEDURE — 99233 SBSQ HOSP IP/OBS HIGH 50: CPT

## 2018-05-23 PROCEDURE — 71045 X-RAY EXAM CHEST 1 VIEW: CPT | Mod: 26

## 2018-05-23 RX ORDER — ACETAMINOPHEN 500 MG
1000 TABLET ORAL ONCE
Qty: 0 | Refills: 0 | Status: COMPLETED | OUTPATIENT
Start: 2018-05-23 | End: 2018-05-23

## 2018-05-23 RX ORDER — HEPARIN SODIUM 5000 [USP'U]/ML
5000 INJECTION INTRAVENOUS; SUBCUTANEOUS EVERY 8 HOURS
Qty: 0 | Refills: 0 | Status: DISCONTINUED | OUTPATIENT
Start: 2018-05-23 | End: 2018-06-05

## 2018-05-23 RX ORDER — ONDANSETRON 8 MG/1
4 TABLET, FILM COATED ORAL EVERY 6 HOURS
Qty: 0 | Refills: 0 | Status: DISCONTINUED | OUTPATIENT
Start: 2018-05-23 | End: 2018-06-05

## 2018-05-23 RX ORDER — BUPIVACAINE 13.3 MG/ML
20 INJECTION, SUSPENSION, LIPOSOMAL INFILTRATION ONCE
Qty: 0 | Refills: 0 | Status: DISCONTINUED | OUTPATIENT
Start: 2018-05-23 | End: 2018-05-23

## 2018-05-23 RX ORDER — ALBUMIN HUMAN 25 %
1000 VIAL (ML) INTRAVENOUS ONCE
Qty: 0 | Refills: 0 | Status: DISCONTINUED | OUTPATIENT
Start: 2018-05-23 | End: 2018-06-05

## 2018-05-23 RX ORDER — SODIUM CHLORIDE 9 MG/ML
1000 INJECTION, SOLUTION INTRAVENOUS
Qty: 0 | Refills: 0 | Status: DISCONTINUED | OUTPATIENT
Start: 2018-05-23 | End: 2018-05-30

## 2018-05-23 RX ORDER — HYDROMORPHONE HYDROCHLORIDE 2 MG/ML
0.5 INJECTION INTRAMUSCULAR; INTRAVENOUS; SUBCUTANEOUS EVERY 4 HOURS
Qty: 0 | Refills: 0 | Status: DISCONTINUED | OUTPATIENT
Start: 2018-05-23 | End: 2018-05-30

## 2018-05-23 RX ORDER — MAGNESIUM SULFATE 500 MG/ML
2 VIAL (ML) INJECTION ONCE
Qty: 0 | Refills: 0 | Status: COMPLETED | OUTPATIENT
Start: 2018-05-23 | End: 2018-05-23

## 2018-05-23 RX ORDER — HYDROMORPHONE HYDROCHLORIDE 2 MG/ML
1 INJECTION INTRAMUSCULAR; INTRAVENOUS; SUBCUTANEOUS EVERY 4 HOURS
Qty: 0 | Refills: 0 | Status: DISCONTINUED | OUTPATIENT
Start: 2018-05-23 | End: 2018-05-25

## 2018-05-23 RX ORDER — ALBUMIN HUMAN 25 %
1000 VIAL (ML) INTRAVENOUS ONCE
Qty: 0 | Refills: 0 | Status: DISCONTINUED | OUTPATIENT
Start: 2018-05-23 | End: 2018-05-23

## 2018-05-23 RX ADMIN — HYDROMORPHONE HYDROCHLORIDE 0.5 MILLIGRAM(S): 2 INJECTION INTRAMUSCULAR; INTRAVENOUS; SUBCUTANEOUS at 00:00

## 2018-05-23 RX ADMIN — SODIUM CHLORIDE 175 MILLILITER(S): 9 INJECTION INTRAMUSCULAR; INTRAVENOUS; SUBCUTANEOUS at 09:50

## 2018-05-23 RX ADMIN — HYDROMORPHONE HYDROCHLORIDE 1 MILLIGRAM(S): 2 INJECTION INTRAMUSCULAR; INTRAVENOUS; SUBCUTANEOUS at 18:24

## 2018-05-23 RX ADMIN — Medication 50 GRAM(S): at 20:39

## 2018-05-23 RX ADMIN — HYDROMORPHONE HYDROCHLORIDE 0.5 MILLIGRAM(S): 2 INJECTION INTRAMUSCULAR; INTRAVENOUS; SUBCUTANEOUS at 00:15

## 2018-05-23 RX ADMIN — HYDROMORPHONE HYDROCHLORIDE 0.5 MILLIGRAM(S): 2 INJECTION INTRAMUSCULAR; INTRAVENOUS; SUBCUTANEOUS at 22:48

## 2018-05-23 RX ADMIN — Medication 1000 MILLIGRAM(S): at 22:48

## 2018-05-23 RX ADMIN — Medication 400 MILLIGRAM(S): at 20:30

## 2018-05-23 RX ADMIN — HYDROMORPHONE HYDROCHLORIDE 0.5 MILLIGRAM(S): 2 INJECTION INTRAMUSCULAR; INTRAVENOUS; SUBCUTANEOUS at 20:52

## 2018-05-23 RX ADMIN — HYDROMORPHONE HYDROCHLORIDE 0.5 MILLIGRAM(S): 2 INJECTION INTRAMUSCULAR; INTRAVENOUS; SUBCUTANEOUS at 04:28

## 2018-05-23 RX ADMIN — HYDROMORPHONE HYDROCHLORIDE 1 MILLIGRAM(S): 2 INJECTION INTRAMUSCULAR; INTRAVENOUS; SUBCUTANEOUS at 18:29

## 2018-05-23 RX ADMIN — GABAPENTIN 300 MILLIGRAM(S): 400 CAPSULE ORAL at 09:50

## 2018-05-23 RX ADMIN — HYDROMORPHONE HYDROCHLORIDE 0.5 MILLIGRAM(S): 2 INJECTION INTRAMUSCULAR; INTRAVENOUS; SUBCUTANEOUS at 00:45

## 2018-05-23 RX ADMIN — HEPARIN SODIUM 5000 UNIT(S): 5000 INJECTION INTRAVENOUS; SUBCUTANEOUS at 05:58

## 2018-05-23 RX ADMIN — HEPARIN SODIUM 5000 UNIT(S): 5000 INJECTION INTRAVENOUS; SUBCUTANEOUS at 22:47

## 2018-05-23 RX ADMIN — HYDROMORPHONE HYDROCHLORIDE 1 MILLIGRAM(S): 2 INJECTION INTRAMUSCULAR; INTRAVENOUS; SUBCUTANEOUS at 22:00

## 2018-05-23 RX ADMIN — HYDROMORPHONE HYDROCHLORIDE 1 MILLIGRAM(S): 2 INJECTION INTRAMUSCULAR; INTRAVENOUS; SUBCUTANEOUS at 22:48
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
## 2018-05-23 NOTE — CONSULT NOTE ADULT - ASSESSMENT
73 yo M with HIV (last CD4 651, viral load undetectable, compliant on HAART) and chronic pancreatitis who is preop for resection of pseudocyst. His RCRI score is 0 making him low risk for a high risk procedure, however the presence of inferior q waves is suggestive of a prior infarct at some point.     - No further preoperative testing is indicated  - Would resume HIV medications  - Continue supportive care  - Given the presence of Q waves, will need an ischemic evaluation as an outpatient.   - Discussed with Dr. Mounika Brandon MD  Cardiology Fellow   [phone]
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
## 2018-05-23 NOTE — BRIEF OPERATIVE NOTE - OPERATION/FINDINGS
Procedure: Exploratory laparotomy, lysis of adhesions, distal pancreatectomy, splenectomy and segmental colon resection with primary anastomosis.  creation of aditi-en-y with jejunal serosal patch of distal pancreas  Findings: Large pancreatic pseudocyst encasing splenic flexure of colon.  Distal pancreas, pancreatic pseudocyst and spleen removed en bloc.   Short segment aditi-en-y created with jejunal serosal patch of distal pancreas.  Single 21 F mj left at pancreatic resection bed

## 2018-05-23 NOTE — PROGRESS NOTE ADULT - SUBJECTIVE AND OBJECTIVE BOX
Team 1 Surgery Post-Op Note, PCN:     Pre-Op Dx: pancreatitis  Procedure: Exploratory laparotomy    Surgeon: Matty    Subjective: pt seen at bedside, complains of back pain. denies abdominal pain, nausea, and vomiting      Vital Signs Last 24 Hrs  T(C): 36.8 (23 May 2018 18:30), Max: 37.2 (23 May 2018 05:12)  T(F): 98.2 (23 May 2018 18:30), Max: 99 (23 May 2018 05:12)  HR: 73 (23 May 2018 20:00) (68 - 76)  BP: 140/63 (23 May 2018 20:00) (119/64 - 142/70)  BP(mean): 86 (23 May 2018 18:00) (81 - 89)  RR: 17 (23 May 2018 20:00) (11 - 24)  SpO2: 98% (23 May 2018 20:00) (96% - 100%)    Physical Exam:  General: NAD, resting comfortably in bed  Pulmonary: Nonlabored breathing, no respiratory distress  Cardiovascular: NSR  Abdominal: soft, NT/ND, incision with dressing, nonsaturated, VALERIA drain in place to self suction with minimal serosang fluid  Extremities: WWP, normal strength  Pulses: palpable distal pulses      LABS:                        9.3    10.4  )-----------( 293      ( 23 May 2018 18:35 )             29.0         140  |  102  |  12  ----------------------------<  153<H>  4.7   |  20<L>  |  1.00    Ca    8.3<L>      23 May 2018 18:31  Phos  3.5       Mg     1.9         TPro  7.5  /  Alb  3.1<L>  /  TBili  0.3  /  DBili  x   /  AST  27  /  ALT  25  /  AlkPhos  82  23    PT/INR - ( 23 May 2018 15:40 )   PT: 14.5 sec;   INR: 1.30          PTT - ( 23 May 2018 06:31 )  PTT:26.1 sec  CAPILLARY BLOOD GLUCOSE      POCT Blood Glucose.: 114 mg/dL (23 May 2018 14:24)    Urinalysis Basic - ( 22 May 2018 01:34 )    Color: Yellow / Appearance: Clear / S.015 / pH: x  Gluc: x / Ketone: NEGATIVE  / Bili: Negative / Urobili: 1.0 E.U./dL   Blood: x / Protein: Trace mg/dL / Nitrite: NEGATIVE   Leuk Esterase: NEGATIVE / RBC: < 5 /HPF / WBC < 5 /HPF   Sq Epi: x / Non Sq Epi: x / Bacteria: Present /HPF      LIVER FUNCTIONS - ( 23 May 2018 06:31 )  Alb: 3.1 g/dL / Pro: 7.5 g/dL / ALK PHOS: 82 U/L / ALT: 25 U/L / AST: 27 U/L / GGT: x                 Radiology and Additional Studies:    Assessment:72y Male s/p above procedure    Plan:  Pain/nausea control PRN  Home meds  Incentive spirometer/OOB/Ambulate  IVF, Diet: NPO/NGT  AM labs  Christensen

## 2018-05-23 NOTE — PROGRESS NOTE ADULT - SUBJECTIVE AND OBJECTIVE BOX
O/N: pt preopped for the OR tomorrow, ARTHUR  5/22: admitted for acute on chronic pancreatitis (with pancreatic insufficiency) and enlarging pancreatic pseudocyst. Medicine recommends echo and EKG. Cards consulted. O/N: pt preopped for the OR tomorrow, ARTHUR  : admitted for acute on chronic pancreatitis (with pancreatic insufficiency) and enlarging pancreatic pseudocyst. Medicine recommends echo and EKG. Cards consulted.       SUBJECTIVE: Patient seen and examined bedside by chief resident. OR today.         Vital Signs Last 24 Hrs  T(C): 36.5 (23 May 2018 09:47), Max: 37.2 (22 May 2018 18:32)  T(F): 97.7 (23 May 2018 09:47), Max: 99 (23 May 2018 05:12)  HR: 71 (23 May 2018 09:47) (68 - 73)  BP: 131/75 (23 May 2018 09:47) (110/65 - 131/79)  BP(mean): --  RR: 16 (23 May 2018 09:47) (16 - 18)  SpO2: 98% (23 May 2018 09:47) (96% - 98%)  I&O's Detail    22 May 2018 07:01  -  23 May 2018 07:00  --------------------------------------------------------  IN:    Oral Fluid: 360 mL    sodium chloride 0.9%: 1575 mL  Total IN: 1935 mL    OUT:  Total OUT: 0 mL    Total NET: 1935 mL          General: NAD, resting comfortably in bed  C/V: NSR  Pulm: Nonlabored breathing, no respiratory distress  Abd: soft, NT/ND.   Extrem: WWP, no edema, SCDs in place        LABS:                        10.3   6.0   )-----------( 306      ( 23 May 2018 06:31 )             32.4     05-    138  |  103  |  13  ----------------------------<  115<H>  4.3   |  25  |  1.09    Ca    8.7      23 May 2018 06:31  Phos  2.7     05  Mg     2.3         TPro  7.5  /  Alb  3.1<L>  /  TBili  0.3  /  DBili  x   /  AST  27  /  ALT  25  /  AlkPhos  82  05-23    PT/INR - ( 23 May 2018 15:40 )   PT: 14.5 sec;   INR: 1.30          PTT - ( 23 May 2018 06:31 )  PTT:26.1 sec  Urinalysis Basic - ( 22 May 2018 01:34 )    Color: Yellow / Appearance: Clear / S.015 / pH: x  Gluc: x / Ketone: NEGATIVE  / Bili: Negative / Urobili: 1.0 E.U./dL   Blood: x / Protein: Trace mg/dL / Nitrite: NEGATIVE   Leuk Esterase: NEGATIVE / RBC: < 5 /HPF / WBC < 5 /HPF   Sq Epi: x / Non Sq Epi: x / Bacteria: Present /HPF        RADIOLOGY & ADDITIONAL STUDIES:

## 2018-05-23 NOTE — BRIEF OPERATIVE NOTE - PROCEDURE POST
<<-----Click on this checkbox to enter Post-Op Dx no abdominal pain, no bloating, no constipation, no diarrhea, no nausea and no vomiting.

## 2018-05-24 DIAGNOSIS — E87.2 ACIDOSIS: ICD-10-CM

## 2018-05-24 LAB
4/8 RATIO: 0.35 RATIO — LOW (ref 0.9–3.6)
ABS CD8: 1152 /UL — HIGH (ref 136–757)
ANION GAP SERPL CALC-SCNC: 19 MMOL/L — HIGH (ref 5–17)
BUN SERPL-MCNC: 10 MG/DL — SIGNIFICANT CHANGE UP (ref 7–23)
CALCIUM SERPL-MCNC: 8.8 MG/DL — SIGNIFICANT CHANGE UP (ref 8.4–10.5)
CD16+CD56+ CELLS NFR BLD: 17 % — SIGNIFICANT CHANGE UP (ref 4–25)
CD16+CD56+ CELLS NFR SPEC: 357 /UL — SIGNIFICANT CHANGE UP (ref 64–494)
CD19 BLASTS SPEC-ACNC: 101 /UL — SIGNIFICANT CHANGE UP (ref 68–528)
CD19 BLASTS SPEC-ACNC: 5 % — SIGNIFICANT CHANGE UP (ref 5–22)
CD3 BLASTS SPEC-ACNC: 1567 /UL — SIGNIFICANT CHANGE UP (ref 799–2171)
CD3 BLASTS SPEC-ACNC: 77 % — SIGNIFICANT CHANGE UP (ref 59–85)
CD4 %: 20 % — LOW (ref 36–65)
CD8 %: 57 % — HIGH (ref 11–36)
CHLORIDE SERPL-SCNC: 102 MMOL/L — SIGNIFICANT CHANGE UP (ref 96–108)
CO2 SERPL-SCNC: 19 MMOL/L — LOW (ref 22–31)
CREAT SERPL-MCNC: 0.91 MG/DL — SIGNIFICANT CHANGE UP (ref 0.5–1.3)
GLUCOSE SERPL-MCNC: 151 MG/DL — HIGH (ref 70–99)
GRAM STN FLD: SIGNIFICANT CHANGE UP
HCT VFR BLD CALC: 32.1 % — LOW (ref 39–50)
HGB BLD-MCNC: 10.3 G/DL — LOW (ref 13–17)
MAGNESIUM SERPL-MCNC: 2.2 MG/DL — SIGNIFICANT CHANGE UP (ref 1.6–2.6)
MCHC RBC-ENTMCNC: 30 PG — SIGNIFICANT CHANGE UP (ref 27–34)
MCHC RBC-ENTMCNC: 32.1 G/DL — SIGNIFICANT CHANGE UP (ref 32–36)
MCV RBC AUTO: 93.6 FL — SIGNIFICANT CHANGE UP (ref 80–100)
PHOSPHATE SERPL-MCNC: 3.4 MG/DL — SIGNIFICANT CHANGE UP (ref 2.5–4.5)
PLATELET # BLD AUTO: 338 K/UL — SIGNIFICANT CHANGE UP (ref 150–400)
POTASSIUM SERPL-MCNC: 4.5 MMOL/L — SIGNIFICANT CHANGE UP (ref 3.5–5.3)
POTASSIUM SERPL-SCNC: 4.5 MMOL/L — SIGNIFICANT CHANGE UP (ref 3.5–5.3)
RBC # BLD: 3.43 M/UL — LOW (ref 4.2–5.8)
RBC # FLD: 14.1 % — SIGNIFICANT CHANGE UP (ref 10.3–16.9)
SODIUM SERPL-SCNC: 140 MMOL/L — SIGNIFICANT CHANGE UP (ref 135–145)
SPECIMEN SOURCE: SIGNIFICANT CHANGE UP
T-CELL CD4 SUBSET PNL BLD: 405 /UL — LOW (ref 489–1457)
WBC # BLD: 16 K/UL — HIGH (ref 3.8–10.5)
WBC # FLD AUTO: 16 K/UL — HIGH (ref 3.8–10.5)

## 2018-05-24 PROCEDURE — 99233 SBSQ HOSP IP/OBS HIGH 50: CPT

## 2018-05-24 RX ORDER — HYDROMORPHONE HYDROCHLORIDE 2 MG/ML
0.5 INJECTION INTRAMUSCULAR; INTRAVENOUS; SUBCUTANEOUS ONCE
Qty: 0 | Refills: 0 | Status: DISCONTINUED | OUTPATIENT
Start: 2018-05-24 | End: 2018-05-25

## 2018-05-24 RX ORDER — HYDROMORPHONE HYDROCHLORIDE 2 MG/ML
0.5 INJECTION INTRAMUSCULAR; INTRAVENOUS; SUBCUTANEOUS ONCE
Qty: 0 | Refills: 0 | Status: DISCONTINUED | OUTPATIENT
Start: 2018-05-24 | End: 2018-05-24

## 2018-05-24 RX ORDER — ACETAMINOPHEN 500 MG
1000 TABLET ORAL ONCE
Qty: 0 | Refills: 0 | Status: COMPLETED | OUTPATIENT
Start: 2018-05-24 | End: 2018-05-24

## 2018-05-24 RX ADMIN — HYDROMORPHONE HYDROCHLORIDE 1 MILLIGRAM(S): 2 INJECTION INTRAMUSCULAR; INTRAVENOUS; SUBCUTANEOUS at 18:09

## 2018-05-24 RX ADMIN — SODIUM CHLORIDE 125 MILLILITER(S): 9 INJECTION, SOLUTION INTRAVENOUS at 02:00

## 2018-05-24 RX ADMIN — HEPARIN SODIUM 5000 UNIT(S): 5000 INJECTION INTRAVENOUS; SUBCUTANEOUS at 15:42

## 2018-05-24 RX ADMIN — HEPARIN SODIUM 5000 UNIT(S): 5000 INJECTION INTRAVENOUS; SUBCUTANEOUS at 06:42

## 2018-05-24 RX ADMIN — Medication 400 MILLIGRAM(S): at 15:40

## 2018-05-24 RX ADMIN — HYDROMORPHONE HYDROCHLORIDE 0.5 MILLIGRAM(S): 2 INJECTION INTRAMUSCULAR; INTRAVENOUS; SUBCUTANEOUS at 12:36

## 2018-05-24 RX ADMIN — HYDROMORPHONE HYDROCHLORIDE 1 MILLIGRAM(S): 2 INJECTION INTRAMUSCULAR; INTRAVENOUS; SUBCUTANEOUS at 02:29

## 2018-05-24 RX ADMIN — HYDROMORPHONE HYDROCHLORIDE 0.5 MILLIGRAM(S): 2 INJECTION INTRAMUSCULAR; INTRAVENOUS; SUBCUTANEOUS at 08:42

## 2018-05-24 RX ADMIN — HEPARIN SODIUM 5000 UNIT(S): 5000 INJECTION INTRAVENOUS; SUBCUTANEOUS at 22:14

## 2018-05-24 RX ADMIN — HYDROMORPHONE HYDROCHLORIDE 0.5 MILLIGRAM(S): 2 INJECTION INTRAMUSCULAR; INTRAVENOUS; SUBCUTANEOUS at 02:22

## 2018-05-24 RX ADMIN — HYDROMORPHONE HYDROCHLORIDE 0.5 MILLIGRAM(S): 2 INJECTION INTRAMUSCULAR; INTRAVENOUS; SUBCUTANEOUS at 02:23

## 2018-05-24 RX ADMIN — HYDROMORPHONE HYDROCHLORIDE 0.5 MILLIGRAM(S): 2 INJECTION INTRAMUSCULAR; INTRAVENOUS; SUBCUTANEOUS at 10:37

## 2018-05-24 RX ADMIN — HYDROMORPHONE HYDROCHLORIDE 1 MILLIGRAM(S): 2 INJECTION INTRAMUSCULAR; INTRAVENOUS; SUBCUTANEOUS at 10:30

## 2018-05-24 RX ADMIN — HYDROMORPHONE HYDROCHLORIDE 1 MILLIGRAM(S): 2 INJECTION INTRAMUSCULAR; INTRAVENOUS; SUBCUTANEOUS at 02:28

## 2018-05-24 RX ADMIN — SODIUM CHLORIDE 125 MILLILITER(S): 9 INJECTION, SOLUTION INTRAVENOUS at 15:42

## 2018-05-24 RX ADMIN — HYDROMORPHONE HYDROCHLORIDE 1 MILLIGRAM(S): 2 INJECTION INTRAMUSCULAR; INTRAVENOUS; SUBCUTANEOUS at 06:42

## 2018-05-24 RX ADMIN — HYDROMORPHONE HYDROCHLORIDE 1 MILLIGRAM(S): 2 INJECTION INTRAMUSCULAR; INTRAVENOUS; SUBCUTANEOUS at 22:50

## 2018-05-24 RX ADMIN — HYDROMORPHONE HYDROCHLORIDE 1 MILLIGRAM(S): 2 INJECTION INTRAMUSCULAR; INTRAVENOUS; SUBCUTANEOUS at 18:25

## 2018-05-24 RX ADMIN — HYDROMORPHONE HYDROCHLORIDE 1 MILLIGRAM(S): 2 INJECTION INTRAMUSCULAR; INTRAVENOUS; SUBCUTANEOUS at 22:14

## 2018-05-24 RX ADMIN — HYDROMORPHONE HYDROCHLORIDE 1 MILLIGRAM(S): 2 INJECTION INTRAMUSCULAR; INTRAVENOUS; SUBCUTANEOUS at 10:15

## 2018-05-24 NOTE — PROGRESS NOTE ADULT - PROBLEM SELECTOR PLAN 6
absolute CD4 count mildly decreased at 405, HIV1 viral load undecidable  -suggest to continue with current HAART regimen w/plan for outpatient follow up -would c/w home statin dose - pt may need a high dose statin based on his cardiac history  - can obtain fasting lipid profile as part of additional work up.

## 2018-05-24 NOTE — PROGRESS NOTE ADULT - PROBLEM SELECTOR PLAN 3
-would recheck INR w/next set of labs and continue to monitor, given that patient's mixing studies corrected the coagulopathy - possible factor deficiency, likely VII or Vitamin K deficiency , suggest continued outpatient work up -would recheck INR w/next set of labs and continue to monitor, given that patient's mixing studies corrected the coagulopathy - possible factor deficiency, likely VII or Vitamin K deficiency, suggest continued outpatient work up possibly secondary to starvation ketosis secondary to decreased PO intake vs lactic acidosis   -recommend to check lactate and UA to asses for ketonuria   -advance diet as per primary team

## 2018-05-24 NOTE — PROGRESS NOTE ADULT - PROBLEM SELECTOR PLAN 2
echocardiogram consistent w/diastolic CHF, EF 65-70%, increased LA pressure, EKG consistent w/NSR and PVC  -no need for additional medical management at this time  -as pt reports LE edema at baseline would restart HTZ or consider starting lasix upon discharge w/plan for f/u w/patient's PCP

## 2018-05-24 NOTE — PROGRESS NOTE ADULT - SUBJECTIVE AND OBJECTIVE BOX
OVERNIGHT EVENTS: s/p OR for exlap, distal pancreatectomy and splenectomy      SUBJECTIVE / INTERVAL HPI: Patient seen and examined at bedside. Pt reports being in a lot of abdominal pain (10/10 sharp, constant pain) and having disagreements with the nurses in the PACU about his son visiting overnight and requesting a pain management doctor to see him.     VITAL SIGNS:  Vital Signs Last 24 Hrs  T(C): 37 (24 May 2018 09:00), Max: 37 (24 May 2018 09:00)  T(F): 98.6 (24 May 2018 09:00), Max: 98.6 (24 May 2018 09:00)  HR: 96 (24 May 2018 12:00) (68 - 96)  BP: 150/74 (24 May 2018 12:00) (119/64 - 152/86)  BP(mean): 86 (24 May 2018 10:00) (81 - 97)  RR: 16 (24 May 2018 12:00) (11 - 37)  SpO2: 94% (24 May 2018 12:00) (92% - 100%)    PHYSICAL EXAM:    General: WDWN, with shirt covering his head, in NAD  HEENT: NC/AT; PERRL, clear conjunctiva  Neck: supple  Cardiovascular: +S1/S2; RRR  Respiratory: CTA b/l; no W/R/R  Gastrointestinal: soft, diffuse tenderness to deep palpation, surgical wound clean, dry, well healing, drain in place draining sanguinous fluid taped to left thigh  Extremities: WWP; 2+ peripheral pulses; no edema   Neurological: AAOx3; no focal deficits    MEDICATIONS:  MEDICATIONS  (STANDING):  albumin human  5% IVPB 1000 milliLiter(s) IV Intermittent once  heparin  Injectable 5000 Unit(s) SubCutaneous every 8 hours  lactated ringers. 1000 milliLiter(s) (125 mL/Hr) IV Continuous <Continuous>    MEDICATIONS  (PRN):  HYDROmorphone  Injectable 1 milliGRAM(s) IV Push every 4 hours PRN Severe Pain  HYDROmorphone  Injectable 0.5 milliGRAM(s) IV Push every 4 hours PRN Moderate Pain (4 - 6)  HYDROmorphone  Injectable 0.5 milliGRAM(s) IV Push once PRN Severe Pain (7 - 10)  ondansetron Injectable 4 milliGRAM(s) IV Push every 6 hours PRN Nausea      ALLERGIES:  Allergies    No Known Allergies    Intolerances        LABS:                        10.3   16.0  )-----------( 338      ( 24 May 2018 04:45 )             32.1     05-24    140  |  102  |  10  ----------------------------<  151<H>  4.5   |  19<L>  |  0.91    Ca    8.8      24 May 2018 04:45  Phos  3.4     05-24  Mg     2.2     05-24    TPro  7.5  /  Alb  3.1<L>  /  TBili  0.3  /  DBili  x   /  AST  27  /  ALT  25  /  AlkPhos  82  05-23    PT/INR - ( 23 May 2018 15:40 )   PT: 14.5 sec;   INR: 1.30          PTT - ( 23 May 2018 06:31 )  PTT:26.1 sec    CAPILLARY BLOOD GLUCOSE      POCT Blood Glucose.: 114 mg/dL (23 May 2018 14:24)      RADIOLOGY & ADDITIONAL TESTS: Reviewed.

## 2018-05-24 NOTE — PROGRESS NOTE ADULT - PROBLEM SELECTOR PLAN 4
now exlap, distal pancreatectomy and splenectomy  - plan as per primary team -would recheck INR w/next set of labs and continue to monitor, given that patient's mixing studies corrected the coagulopathy - possible factor deficiency, likely VII or Vitamin K deficiency, suggest continued outpatient work up
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
## 2018-05-24 NOTE — PROGRESS NOTE ADULT - PROBLEM SELECTOR PLAN 1
mildly hypertensive after surgical procedure, likely pain related  -would treat pain as per primary team and hold off on antihypertensives at this time  -continue to monitor w/plan to restart home HTZ if needed

## 2018-05-24 NOTE — PROGRESS NOTE ADULT - PROBLEM SELECTOR PLAN 5
-would c/w home statin dose - pt may need a high dose statin based on his cardiac history  - can obtain fasting lipid profile as part of additional work up. now exlap, distal pancreatectomy and splenectomy  - plan as per primary team

## 2018-05-24 NOTE — PROGRESS NOTE ADULT - PROBLEM SELECTOR PLAN 7
absolute CD4 count mildly decreased at 405, HIV1 viral load undecidable  -suggest to continue with current HAART regimen w/plan for outpatient follow up

## 2018-05-24 NOTE — PROGRESS NOTE ADULT - ASSESSMENT
71 yo M with pmhx of HIV (last CD4 651, viral load undetectable), sCHF w/EF of 38%, PVD, HTN and chronic pancreatitis with pancreatic insufficiency who presented with 2 weeks of worsening sharp left back pain radiating to LUQ abdominal pain admitted for enlarging pancreatic pseudocyst and possible surgical resection, medicine consult called for preoperative clearance, now s/p exlap, distal pancreatectomy and splenectomy  in the PACU.

## 2018-05-25 DIAGNOSIS — R50.9 FEVER, UNSPECIFIED: ICD-10-CM

## 2018-05-25 DIAGNOSIS — I50.32 CHRONIC DIASTOLIC (CONGESTIVE) HEART FAILURE: ICD-10-CM

## 2018-05-25 LAB
ANION GAP SERPL CALC-SCNC: 13 MMOL/L — SIGNIFICANT CHANGE UP (ref 5–17)
BUN SERPL-MCNC: 9 MG/DL — SIGNIFICANT CHANGE UP (ref 7–23)
CALCIUM SERPL-MCNC: 8.7 MG/DL — SIGNIFICANT CHANGE UP (ref 8.4–10.5)
CHLORIDE SERPL-SCNC: 98 MMOL/L — SIGNIFICANT CHANGE UP (ref 96–108)
CO2 SERPL-SCNC: 27 MMOL/L — SIGNIFICANT CHANGE UP (ref 22–31)
CREAT SERPL-MCNC: 0.8 MG/DL — SIGNIFICANT CHANGE UP (ref 0.5–1.3)
CULTURE RESULTS: NO GROWTH — SIGNIFICANT CHANGE UP
GLUCOSE SERPL-MCNC: 133 MG/DL — HIGH (ref 70–99)
HCT VFR BLD CALC: 28 % — LOW (ref 39–50)
HGB BLD-MCNC: 9.1 G/DL — LOW (ref 13–17)
MAGNESIUM SERPL-MCNC: 2 MG/DL — SIGNIFICANT CHANGE UP (ref 1.6–2.6)
MCHC RBC-ENTMCNC: 29.6 PG — SIGNIFICANT CHANGE UP (ref 27–34)
MCHC RBC-ENTMCNC: 32.5 G/DL — SIGNIFICANT CHANGE UP (ref 32–36)
MCV RBC AUTO: 91.2 FL — SIGNIFICANT CHANGE UP (ref 80–100)
PHOSPHATE SERPL-MCNC: 2 MG/DL — LOW (ref 2.5–4.5)
PLATELET # BLD AUTO: 354 K/UL — SIGNIFICANT CHANGE UP (ref 150–400)
POTASSIUM SERPL-MCNC: 3.8 MMOL/L — SIGNIFICANT CHANGE UP (ref 3.5–5.3)
POTASSIUM SERPL-SCNC: 3.8 MMOL/L — SIGNIFICANT CHANGE UP (ref 3.5–5.3)
RBC # BLD: 3.07 M/UL — LOW (ref 4.2–5.8)
RBC # FLD: 14.5 % — SIGNIFICANT CHANGE UP (ref 10.3–16.9)
SODIUM SERPL-SCNC: 138 MMOL/L — SIGNIFICANT CHANGE UP (ref 135–145)
SPECIMEN SOURCE: SIGNIFICANT CHANGE UP
WBC # BLD: 17.9 K/UL — HIGH (ref 3.8–10.5)
WBC # FLD AUTO: 17.9 K/UL — HIGH (ref 3.8–10.5)

## 2018-05-25 PROCEDURE — 99233 SBSQ HOSP IP/OBS HIGH 50: CPT

## 2018-05-25 RX ORDER — DOLUTEGRAVIR SODIUM 25 MG/1
50 TABLET, FILM COATED ORAL DAILY
Qty: 0 | Refills: 0 | Status: DISCONTINUED | OUTPATIENT
Start: 2018-05-25 | End: 2018-06-05

## 2018-05-25 RX ORDER — ACETAMINOPHEN 500 MG
1000 TABLET ORAL ONCE
Qty: 0 | Refills: 0 | Status: COMPLETED | OUTPATIENT
Start: 2018-05-25 | End: 2018-05-25

## 2018-05-25 RX ORDER — POTASSIUM PHOSPHATE, MONOBASIC POTASSIUM PHOSPHATE, DIBASIC 236; 224 MG/ML; MG/ML
15 INJECTION, SOLUTION INTRAVENOUS ONCE
Qty: 0 | Refills: 0 | Status: COMPLETED | OUTPATIENT
Start: 2018-05-25 | End: 2018-05-25

## 2018-05-25 RX ORDER — OXYCODONE AND ACETAMINOPHEN 5; 325 MG/1; MG/1
2 TABLET ORAL EVERY 4 HOURS
Qty: 0 | Refills: 0 | Status: DISCONTINUED | OUTPATIENT
Start: 2018-05-25 | End: 2018-05-30

## 2018-05-25 RX ORDER — EMTRICITABINE, RILPIVIRINE HYDROCHLORIDE, AND TENOFOVIR DISOPROXIL FUMARATE 200; 25; 300 MG/1; MG/1; MG/1
1 TABLET, FILM COATED ORAL
Qty: 0 | Refills: 0 | Status: DISCONTINUED | OUTPATIENT
Start: 2018-05-25 | End: 2018-06-05

## 2018-05-25 RX ORDER — OXYCODONE AND ACETAMINOPHEN 5; 325 MG/1; MG/1
1 TABLET ORAL EVERY 4 HOURS
Qty: 0 | Refills: 0 | Status: DISCONTINUED | OUTPATIENT
Start: 2018-05-25 | End: 2018-06-02

## 2018-05-25 RX ADMIN — POTASSIUM PHOSPHATE, MONOBASIC POTASSIUM PHOSPHATE, DIBASIC 62.5 MILLIMOLE(S): 236; 224 INJECTION, SOLUTION INTRAVENOUS at 10:05

## 2018-05-25 RX ADMIN — DOLUTEGRAVIR SODIUM 50 MILLIGRAM(S): 25 TABLET, FILM COATED ORAL at 19:34

## 2018-05-25 RX ADMIN — OXYCODONE AND ACETAMINOPHEN 2 TABLET(S): 5; 325 TABLET ORAL at 19:37

## 2018-05-25 RX ADMIN — HYDROMORPHONE HYDROCHLORIDE 1 MILLIGRAM(S): 2 INJECTION INTRAMUSCULAR; INTRAVENOUS; SUBCUTANEOUS at 14:28

## 2018-05-25 RX ADMIN — SODIUM CHLORIDE 125 MILLILITER(S): 9 INJECTION, SOLUTION INTRAVENOUS at 00:38

## 2018-05-25 RX ADMIN — SODIUM CHLORIDE 125 MILLILITER(S): 9 INJECTION, SOLUTION INTRAVENOUS at 04:06

## 2018-05-25 RX ADMIN — HYDROMORPHONE HYDROCHLORIDE 1 MILLIGRAM(S): 2 INJECTION INTRAMUSCULAR; INTRAVENOUS; SUBCUTANEOUS at 05:31

## 2018-05-25 RX ADMIN — OXYCODONE AND ACETAMINOPHEN 2 TABLET(S): 5; 325 TABLET ORAL at 20:35

## 2018-05-25 RX ADMIN — HYDROMORPHONE HYDROCHLORIDE 1 MILLIGRAM(S): 2 INJECTION INTRAMUSCULAR; INTRAVENOUS; SUBCUTANEOUS at 10:20

## 2018-05-25 RX ADMIN — HEPARIN SODIUM 5000 UNIT(S): 5000 INJECTION INTRAVENOUS; SUBCUTANEOUS at 05:31

## 2018-05-25 RX ADMIN — Medication 400 MILLIGRAM(S): at 00:38

## 2018-05-25 RX ADMIN — HEPARIN SODIUM 5000 UNIT(S): 5000 INJECTION INTRAVENOUS; SUBCUTANEOUS at 14:28

## 2018-05-25 RX ADMIN — OXYCODONE AND ACETAMINOPHEN 2 TABLET(S): 5; 325 TABLET ORAL at 23:34

## 2018-05-25 RX ADMIN — HYDROMORPHONE HYDROCHLORIDE 1 MILLIGRAM(S): 2 INJECTION INTRAMUSCULAR; INTRAVENOUS; SUBCUTANEOUS at 10:06

## 2018-05-25 RX ADMIN — EMTRICITABINE, RILPIVIRINE HYDROCHLORIDE, AND TENOFOVIR DISOPROXIL FUMARATE 1 TABLET(S): 200; 25; 300 TABLET, FILM COATED ORAL at 19:34

## 2018-05-25 RX ADMIN — HYDROMORPHONE HYDROCHLORIDE 1 MILLIGRAM(S): 2 INJECTION INTRAMUSCULAR; INTRAVENOUS; SUBCUTANEOUS at 15:05

## 2018-05-25 RX ADMIN — HYDROMORPHONE HYDROCHLORIDE 1 MILLIGRAM(S): 2 INJECTION INTRAMUSCULAR; INTRAVENOUS; SUBCUTANEOUS at 06:00

## 2018-05-25 RX ADMIN — HEPARIN SODIUM 5000 UNIT(S): 5000 INJECTION INTRAVENOUS; SUBCUTANEOUS at 21:21

## 2018-05-25 NOTE — PROGRESS NOTE ADULT - PROBLEM SELECTOR PLAN 5
-would recheck INR w/next set of labs and continue to monitor, given that patient's mixing studies corrected the coagulopathy - possible factor deficiency, likely VII or Vitamin K deficiency, suggest continued outpatient work up

## 2018-05-25 NOTE — DIETITIAN INITIAL EVALUATION ADULT. - ENERGY NEEDS
Height: 5'11" Weight: 212lbs, IBW 172lbs+/-10%, %%, BMI 29.6  IBW used for calculations as pt >120% of IBW   Nutrient needs based on [facility] standards of care for maintenance in older adults.   Needs adjusted for age and post-op

## 2018-05-25 NOTE — PROGRESS NOTE ADULT - SUBJECTIVE AND OBJECTIVE BOX
O/N: temp of 100.4-chief aware, tylenol given  5/24: temp of 100.3, tylenol given, chief aware, post tylenol temp 98.9 O/N: temp of 100.4-chief aware, tylenol given  5/24: temp of 100.3, tylenol given, chief aware, post tylenol temp 98.9    STATUS POST:       SUBJECTIVE: Patient seen and examined bedside by chief resident. Low-grade fever resolved.     heparin  Injectable 5000 Unit(s) SubCutaneous every 8 hours      Vital Signs Last 24 Hrs  T(C): 37.4 (25 May 2018 05:07), Max: 38 (25 May 2018 00:14)  T(F): 99.3 (25 May 2018 05:07), Max: 100.4 (25 May 2018 00:14)  HR: 75 (25 May 2018 05:07) (75 - 96)  BP: 149/78 (25 May 2018 05:07) (142/67 - 167/56)  BP(mean): 94 (24 May 2018 13:00) (86 - 94)  RR: 17 (25 May 2018 05:07) (15 - 34)  SpO2: 97% (25 May 2018 05:07) (92% - 97%)  I&O's Detail    23 May 2018 07:01  -  24 May 2018 07:00  --------------------------------------------------------  IN:    Albumin 5%  - 250 mL: 1000 mL    Lactated Ringers IV Bolus: 1500 mL    lactated ringers.: 1750 mL  Total IN: 4250 mL    OUT:    Bulb: 70 mL    Estimated Blood Loss: 300 mL    Indwelling Catheter - Urethral: 1090 mL    Nasoenteral Tube: 60 mL  Total OUT: 1520 mL    Total NET: 2730 mL      24 May 2018 07:01  -  25 May 2018 06:25  --------------------------------------------------------  IN:    IV PiggyBack: 100 mL    lactated ringers.: 2875 mL  Total IN: 2975 mL    OUT:    Bulb: 25 mL    Indwelling Catheter - Urethral: 1475 mL    Nasoenteral Tube: 400 mL  Total OUT: 1900 mL    Total NET: 1075 mL          General: NAD, resting comfortably in bed  C/V: NSR  Pulm: Nonlabored breathing, no respiratory distress  Abd: soft, NT/ND. Incisions CDI  Extrem: WWP, no edema, SCDs in place        LABS:                        10.3   16.0  )-----------( 338      ( 24 May 2018 04:45 )             32.1     05-24    140  |  102  |  10  ----------------------------<  151<H>  4.5   |  19<L>  |  0.91    Ca    8.8      24 May 2018 04:45  Phos  3.4     05-24  Mg     2.2     05-24    TPro  7.5  /  Alb  3.1<L>  /  TBili  0.3  /  DBili  x   /  AST  27  /  ALT  25  /  AlkPhos  82  05-23    PT/INR - ( 23 May 2018 15:40 )   PT: 14.5 sec;   INR: 1.30          PTT - ( 23 May 2018 06:31 )  PTT:26.1 sec      RADIOLOGY & ADDITIONAL STUDIES:

## 2018-05-25 NOTE — PROGRESS NOTE ADULT - PROBLEM SELECTOR PLAN 3
most recent echocardiogram consistent w/diastolic CHF, EF 65-70%, increased LA pressure, EKG consistent w/NSR and PVC  -no need for additional medical management at this time  -as pt reports LE edema at baseline would restart HTZ or consider starting lasix upon discharge w/plan for f/u w/patient's PCP

## 2018-05-25 NOTE — PROGRESS NOTE ADULT - SUBJECTIVE AND OBJECTIVE BOX
OVERNIGHT EVENTS: pt spiked a fever of 100.4    SUBJECTIVE / INTERVAL HPI: Patient seen and examined at bedside.     VITAL SIGNS:  Vital Signs Last 24 Hrs  T(C): 37.6 (25 May 2018 09:45), Max: 38 (25 May 2018 00:14)  T(F): 99.6 (25 May 2018 09:45), Max: 100.4 (25 May 2018 00:14)  HR: 83 (25 May 2018 09:45) (75 - 96)  BP: 140/67 (25 May 2018 09:45) (140/67 - 167/56)  BP(mean): 94 (24 May 2018 13:00) (94 - 94)  RR: 18 (25 May 2018 09:45) (16 - 30)  SpO2: 94% (25 May 2018 09:45) (93% - 97%)    PHYSICAL EXAM:    General: WDWN  HEENT: NC/AT; PERRL, clear conjunctiva  Neck: supple  Cardiovascular: +S1/S2; RRR  Respiratory: CTA b/l; no W/R/R  Gastrointestinal: soft, NT/ND; +BSx4  Extremities: WWP; 2+ peripheral pulses; no edema   Neurological: AAOx3; no focal deficits    MEDICATIONS:  MEDICATIONS  (STANDING):  albumin human  5% IVPB 1000 milliLiter(s) IV Intermittent once  heparin  Injectable 5000 Unit(s) SubCutaneous every 8 hours  lactated ringers. 1000 milliLiter(s) (125 mL/Hr) IV Continuous <Continuous>    MEDICATIONS  (PRN):  HYDROmorphone  Injectable 1 milliGRAM(s) IV Push every 4 hours PRN Severe Pain  HYDROmorphone  Injectable 0.5 milliGRAM(s) IV Push every 4 hours PRN Moderate Pain (4 - 6)  HYDROmorphone  Injectable 0.5 milliGRAM(s) IV Push once PRN Severe Pain (7 - 10)  ondansetron Injectable 4 milliGRAM(s) IV Push every 6 hours PRN Nausea      ALLERGIES:  Allergies    No Known Allergies    Intolerances        LABS:                        9.1    17.9  )-----------( 354      ( 25 May 2018 06:28 )             28.0     05-25    138  |  98  |  9   ----------------------------<  133<H>  3.8   |  27  |  0.80    Ca    8.7      25 May 2018 06:27  Phos  2.0     05-25  Mg     2.0     05-25      PT/INR - ( 23 May 2018 15:40 )   PT: 14.5 sec;   INR: 1.30              CAPILLARY BLOOD GLUCOSE      POCT Blood Glucose.: 114 mg/dL (23 May 2018 14:24)      RADIOLOGY & ADDITIONAL TESTS: Reviewed.    ASSESSMENT:    PLAN: OVERNIGHT EVENTS: pt spiked a fever of 100.4    SUBJECTIVE / INTERVAL HPI: Patient seen and examined at bedside. Pt reports feeling much better today now that his NG tube has been removed, still reporting abdominal pain and concern about not taking his HAART medications since he's been NPO. Pt reports strong history of medication compliance with his HAART medications and has never gone this long without taking his meds.     VITAL SIGNS:  Vital Signs Last 24 Hrs  T(C): 37.6 (25 May 2018 09:45), Max: 38 (25 May 2018 00:14)  T(F): 99.6 (25 May 2018 09:45), Max: 100.4 (25 May 2018 00:14)  HR: 83 (25 May 2018 09:45) (75 - 96)  BP: 140/67 (25 May 2018 09:45) (140/67 - 167/56)  BP(mean): 94 (24 May 2018 13:00) (94 - 94)  RR: 18 (25 May 2018 09:45) (16 - 30)  SpO2: 94% (25 May 2018 09:45) (93% - 97%)    PHYSICAL EXAM:    General: WDWN, resting comfortably in bed  HEENT: NC/AT; PERRL, clear conjunctiva  Neck: supple  Cardiovascular: +S1/S2; RRR  Respiratory: CTA b/l; no W/R/R  Gastrointestinal: soft, tender to deep palpation in all four quadrants, surgical wound clean, dry, intact, surgical drain w/sanguinous drainage   Extremities: WWP; 2+ peripheral pulses; no edema   Neurological: AAOx3; no focal deficits    MEDICATIONS:  MEDICATIONS  (STANDING):  albumin human  5% IVPB 1000 milliLiter(s) IV Intermittent once  heparin  Injectable 5000 Unit(s) SubCutaneous every 8 hours  lactated ringers. 1000 milliLiter(s) (125 mL/Hr) IV Continuous <Continuous>    MEDICATIONS  (PRN):  HYDROmorphone  Injectable 1 milliGRAM(s) IV Push every 4 hours PRN Severe Pain  HYDROmorphone  Injectable 0.5 milliGRAM(s) IV Push every 4 hours PRN Moderate Pain (4 - 6)  HYDROmorphone  Injectable 0.5 milliGRAM(s) IV Push once PRN Severe Pain (7 - 10)  ondansetron Injectable 4 milliGRAM(s) IV Push every 6 hours PRN Nausea      ALLERGIES:  Allergies    No Known Allergies    Intolerances        LABS:                        9.1    17.9  )-----------( 354      ( 25 May 2018 06:28 )             28.0     05-25    138  |  98  |  9   ----------------------------<  133<H>  3.8   |  27  |  0.80    Ca    8.7      25 May 2018 06:27  Phos  2.0     05-25  Mg     2.0     05-25      PT/INR - ( 23 May 2018 15:40 )   PT: 14.5 sec;   INR: 1.30       CAPILLARY BLOOD GLUCOSE      POCT Blood Glucose.: 114 mg/dL (23 May 2018 14:24)      RADIOLOGY & ADDITIONAL TESTS: Reviewed.

## 2018-05-25 NOTE — PROGRESS NOTE ADULT - ASSESSMENT
73 yo M with pmhx of HIV, diastolic CHF, PVD, HTN and chronic pancreatitis with pancreatic insufficiency who presented w/ worsening sharp left back pain radiating to LUQ abdominal pain admitted for enlarging pancreatic pseudocyst and possible surgical resection, medicine consulted for preoperative clearance, now s/p exlap, distal pancreatectomy and splenectomy postop. w/low grade fever.

## 2018-05-25 NOTE — PROGRESS NOTE ADULT - PROBLEM SELECTOR PLAN 7
-would c/w home statin dose - pt may need a high dose statin based on his cardiac history  - can obtain fasting lipid profile as part of additional work up.

## 2018-05-25 NOTE — PROGRESS NOTE ADULT - PROBLEM SELECTOR PLAN 2
spiked fever of 100.4 overnight w/rising leukocytosis and neutrophil predominance (from prior cbc)  -would add on differential to cbc  -would obtain full fever work up w/blood cultures, CXR and UA and hold off on antibiotics pending work up
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
## 2018-05-25 NOTE — DIETITIAN INITIAL EVALUATION ADULT. - OTHER INFO
73yo M w/ PMH HIV, CHF, PVD, HTN and chronic pancreatitis w/ pancreatic insufficiency. Pt p/w 2 weeks of worsening sharp left back pain radiating to LUQ abdominal pain. Admitted for enlarging pancreatic pseudocyst and possible surgical resection. Pt now s/p ex lap, distal pancreatectomy and splenectomy. Pt seen resting in bed. Currently NPO except ice chips. Inquiring about diet advancement. Deneis N/V/D/C. Pain controlled. Discussed diet advancement. Denies wt changes. NKFA or dietary restrictions. Skin: surgical incision; GI abdominal discomfort.

## 2018-05-25 NOTE — PROGRESS NOTE ADULT - PROBLEM SELECTOR PLAN 1
pt remains hypertensive after surgical procedure, likely pain related as well as element of known underlying essential HTN  -would restart home HTZ once tolerating PO  -c/w adequate pain control w/current regimen of dilaudid - as per primary team

## 2018-05-26 DIAGNOSIS — E83.39 OTHER DISORDERS OF PHOSPHORUS METABOLISM: ICD-10-CM

## 2018-05-26 LAB
ANION GAP SERPL CALC-SCNC: 13 MMOL/L — SIGNIFICANT CHANGE UP (ref 5–17)
ANION GAP SERPL CALC-SCNC: 18 MMOL/L — HIGH (ref 5–17)
BUN SERPL-MCNC: 8 MG/DL — SIGNIFICANT CHANGE UP (ref 7–23)
BUN SERPL-MCNC: 8 MG/DL — SIGNIFICANT CHANGE UP (ref 7–23)
CALCIUM SERPL-MCNC: 8.5 MG/DL — SIGNIFICANT CHANGE UP (ref 8.4–10.5)
CALCIUM SERPL-MCNC: 8.6 MG/DL — SIGNIFICANT CHANGE UP (ref 8.4–10.5)
CHLORIDE SERPL-SCNC: 97 MMOL/L — SIGNIFICANT CHANGE UP (ref 96–108)
CHLORIDE SERPL-SCNC: 98 MMOL/L — SIGNIFICANT CHANGE UP (ref 96–108)
CO2 SERPL-SCNC: 24 MMOL/L — SIGNIFICANT CHANGE UP (ref 22–31)
CO2 SERPL-SCNC: 26 MMOL/L — SIGNIFICANT CHANGE UP (ref 22–31)
CREAT SERPL-MCNC: 0.81 MG/DL — SIGNIFICANT CHANGE UP (ref 0.5–1.3)
CREAT SERPL-MCNC: 0.85 MG/DL — SIGNIFICANT CHANGE UP (ref 0.5–1.3)
GLUCOSE SERPL-MCNC: 112 MG/DL — HIGH (ref 70–99)
GLUCOSE SERPL-MCNC: 119 MG/DL — HIGH (ref 70–99)
HCT VFR BLD CALC: 28.6 % — LOW (ref 39–50)
HGB BLD-MCNC: 9.3 G/DL — LOW (ref 13–17)
MAGNESIUM SERPL-MCNC: 1.8 MG/DL — SIGNIFICANT CHANGE UP (ref 1.6–2.6)
MCHC RBC-ENTMCNC: 29.5 PG — SIGNIFICANT CHANGE UP (ref 27–34)
MCHC RBC-ENTMCNC: 32.5 G/DL — SIGNIFICANT CHANGE UP (ref 32–36)
MCV RBC AUTO: 90.8 FL — SIGNIFICANT CHANGE UP (ref 80–100)
PHOSPHATE SERPL-MCNC: 2.2 MG/DL — LOW (ref 2.5–4.5)
PLATELET # BLD AUTO: 389 K/UL — SIGNIFICANT CHANGE UP (ref 150–400)
POTASSIUM SERPL-MCNC: 3.7 MMOL/L — SIGNIFICANT CHANGE UP (ref 3.5–5.3)
POTASSIUM SERPL-MCNC: 3.8 MMOL/L — SIGNIFICANT CHANGE UP (ref 3.5–5.3)
POTASSIUM SERPL-SCNC: 3.7 MMOL/L — SIGNIFICANT CHANGE UP (ref 3.5–5.3)
POTASSIUM SERPL-SCNC: 3.8 MMOL/L — SIGNIFICANT CHANGE UP (ref 3.5–5.3)
RBC # BLD: 3.15 M/UL — LOW (ref 4.2–5.8)
RBC # FLD: 14.5 % — SIGNIFICANT CHANGE UP (ref 10.3–16.9)
SODIUM SERPL-SCNC: 137 MMOL/L — SIGNIFICANT CHANGE UP (ref 135–145)
SODIUM SERPL-SCNC: 139 MMOL/L — SIGNIFICANT CHANGE UP (ref 135–145)
WBC # BLD: 17.2 K/UL — HIGH (ref 3.8–10.5)
WBC # FLD AUTO: 17.2 K/UL — HIGH (ref 3.8–10.5)

## 2018-05-26 PROCEDURE — 99232 SBSQ HOSP IP/OBS MODERATE 35: CPT

## 2018-05-26 RX ORDER — SODIUM,POTASSIUM PHOSPHATES 278-250MG
1 POWDER IN PACKET (EA) ORAL ONCE
Qty: 0 | Refills: 0 | Status: COMPLETED | OUTPATIENT
Start: 2018-05-26 | End: 2018-05-26

## 2018-05-26 RX ADMIN — SODIUM CHLORIDE 125 MILLILITER(S): 9 INJECTION, SOLUTION INTRAVENOUS at 04:02

## 2018-05-26 RX ADMIN — DOLUTEGRAVIR SODIUM 50 MILLIGRAM(S): 25 TABLET, FILM COATED ORAL at 12:02

## 2018-05-26 RX ADMIN — OXYCODONE AND ACETAMINOPHEN 2 TABLET(S): 5; 325 TABLET ORAL at 00:30

## 2018-05-26 RX ADMIN — EMTRICITABINE, RILPIVIRINE HYDROCHLORIDE, AND TENOFOVIR DISOPROXIL FUMARATE 1 TABLET(S): 200; 25; 300 TABLET, FILM COATED ORAL at 17:55

## 2018-05-26 RX ADMIN — Medication 1 PACKET(S): at 12:02

## 2018-05-26 RX ADMIN — SODIUM CHLORIDE 125 MILLILITER(S): 9 INJECTION, SOLUTION INTRAVENOUS at 12:36

## 2018-05-26 RX ADMIN — OXYCODONE AND ACETAMINOPHEN 2 TABLET(S): 5; 325 TABLET ORAL at 07:30

## 2018-05-26 RX ADMIN — OXYCODONE AND ACETAMINOPHEN 2 TABLET(S): 5; 325 TABLET ORAL at 19:07

## 2018-05-26 RX ADMIN — SODIUM CHLORIDE 125 MILLILITER(S): 9 INJECTION, SOLUTION INTRAVENOUS at 21:09

## 2018-05-26 RX ADMIN — HEPARIN SODIUM 5000 UNIT(S): 5000 INJECTION INTRAVENOUS; SUBCUTANEOUS at 06:29

## 2018-05-26 RX ADMIN — OXYCODONE AND ACETAMINOPHEN 2 TABLET(S): 5; 325 TABLET ORAL at 20:10

## 2018-05-26 RX ADMIN — OXYCODONE AND ACETAMINOPHEN 2 TABLET(S): 5; 325 TABLET ORAL at 06:30

## 2018-05-26 RX ADMIN — HEPARIN SODIUM 5000 UNIT(S): 5000 INJECTION INTRAVENOUS; SUBCUTANEOUS at 21:09

## 2018-05-26 RX ADMIN — HEPARIN SODIUM 5000 UNIT(S): 5000 INJECTION INTRAVENOUS; SUBCUTANEOUS at 15:01
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
## 2018-05-26 NOTE — PROGRESS NOTE ADULT - PROBLEM SELECTOR PLAN 2
pt has remained afebrile for 24 hours   w/rising leukocytosis and neutrophil predominance unclear if reactive from surgery vs underlying infectious process   -if pt becomes febrile again would obtain work up w/blood cultures, CXR and UA and hold off on antibiotics pending work up

## 2018-05-26 NOTE — PROGRESS NOTE ADULT - SUBJECTIVE AND OBJECTIVE BOX
O/N: changed to PO pain control  5/25: Amparo MCDANIEL d/emmy'princess. Passed TOV. Tolerating sips/chips. No further fevers. Restarted antiretroviral treatments. O/N: changed to PO pain control  5/25: Amparo MCDANIEL d/kendra. Passed TOV. Tolerating sips/chips. No further fevers. Restarted antiretroviral treatments.       SUBJECTIVE: Patient seen and examined bedside by chief resident. +BM. Tolerating water.    dolutegravir 50 milliGRAM(s) Oral daily  emtricitabine 200 mG/rilpivirine 25 mG/tenofovir 300 mG 1 Tablet(s) Oral with dinner  heparin  Injectable 5000 Unit(s) SubCutaneous every 8 hours      Vital Signs Last 24 Hrs  T(C): 37.3 (26 May 2018 04:30), Max: 37.6 (25 May 2018 09:45)  T(F): 99.1 (26 May 2018 04:30), Max: 99.6 (25 May 2018 09:45)  HR: 80 (26 May 2018 04:30) (80 - 103)  BP: 147/83 (26 May 2018 04:30) (137/67 - 167/83)  BP(mean): --  RR: 17 (26 May 2018 04:30) (17 - 18)  SpO2: 95% (26 May 2018 04:30) (94% - 95%)  I&O's Detail    25 May 2018 07:01  -  26 May 2018 07:00  --------------------------------------------------------  IN:    IV PiggyBack: 250 mL    lactated ringers.: 2625 mL  Total IN: 2875 mL    OUT:    Bulb: 30 mL    Voided: 1550 mL  Total OUT: 1580 mL    Total NET: 1295 mL          General: NAD, resting comfortably in bed  C/V: NSR  Pulm: Nonlabored breathing, no respiratory distress  Abd: soft, NT/ND. incisions clean, dry, and intact  Extrem: WWP, no edema, SCDs in place        LABS:                        9.3    17.2  )-----------( 389      ( 26 May 2018 06:09 )             28.6     05-26    137  |  98  |  8   ----------------------------<  112<H>  3.7   |  26  |  0.81    Ca    8.6      26 May 2018 06:09  Phos  2.2     05-26  Mg     1.8     05-26            RADIOLOGY & ADDITIONAL STUDIES:

## 2018-05-26 NOTE — PROGRESS NOTE ADULT - ASSESSMENT
73 yo M with pmhx of HIV, diastolic CHF, PVD, HTN and chronic pancreatitis with pancreatic insufficiency who presented w/ worsening sharp left back pain radiating to LUQ abdominal pain admitted for enlarging pancreatic pseudocyst and possible surgical resection, medicine consulted for preoperative clearance, now s/p exlap, distal pancreatectomy and splenectomy.

## 2018-05-26 NOTE — PROGRESS NOTE ADULT - PROBLEM SELECTOR PLAN 1
pt remains hypertensive after surgical procedure, likely pain related as well as element of known underlying essential HTN  -would restart home HTZ now that pt is tolerating PO  -c/w adequate pain control w/current regimen of dilaudid - as per primary team

## 2018-05-26 NOTE — PROGRESS NOTE ADULT - PROBLEM SELECTOR PLAN 8
-would c/w home statin dose - pt may need a high dose statin based on his cardiac history  - can obtain fasting lipid profile as part of additional work up.
absolute CD4 count mildly decreased at 405, HIV1 viral load undecidable  -suggest to continue with current HAART regimen w/plan for outpatient follow up

## 2018-05-26 NOTE — PROGRESS NOTE ADULT - SUBJECTIVE AND OBJECTIVE BOX
SUBJECTIVE / INTERVAL HPI: Restarted PO medications. Patient seen and examined at bedside.     VITAL SIGNS:  Vital Signs Last 24 Hrs  T(C): 37.3 (26 May 2018 04:30), Max: 37.6 (25 May 2018 09:45)  T(F): 99.1 (26 May 2018 04:30), Max: 99.6 (25 May 2018 09:45)  HR: 80 (26 May 2018 04:30) (80 - 103)  BP: 147/83 (26 May 2018 04:30) (137/67 - 167/83)  BP(mean): --  RR: 17 (26 May 2018 04:30) (17 - 18)  SpO2: 95% (26 May 2018 04:30) (94% - 95%)    PHYSICAL EXAM: in progress     General: WDWN  HEENT: NC/AT; PERRL, clear conjunctiva  Neck: supple  Cardiovascular: +S1/S2; RRR  Respiratory: CTA b/l; no W/R/R  Gastrointestinal: soft, NT/ND; +BSx4  Extremities: WWP; 2+ peripheral pulses; no edema   Neurological: AAOx3; no focal deficits    MEDICATIONS:  MEDICATIONS  (STANDING):  albumin human  5% IVPB 1000 milliLiter(s) IV Intermittent once  dolutegravir 50 milliGRAM(s) Oral daily  emtricitabine 200 mG/rilpivirine 25 mG/tenofovir 300 mG 1 Tablet(s) Oral with dinner  heparin  Injectable 5000 Unit(s) SubCutaneous every 8 hours  lactated ringers. 1000 milliLiter(s) (125 mL/Hr) IV Continuous <Continuous>    MEDICATIONS  (PRN):  HYDROmorphone  Injectable 0.5 milliGRAM(s) IV Push every 4 hours PRN Moderate Pain (4 - 6)  ondansetron Injectable 4 milliGRAM(s) IV Push every 6 hours PRN Nausea  oxyCODONE    5 mG/acetaminophen 325 mG 1 Tablet(s) Oral every 4 hours PRN Moderate Pain (4 - 6)  oxyCODONE    5 mG/acetaminophen 325 mG 2 Tablet(s) Oral every 4 hours PRN Severe Pain (7 - 10)      ALLERGIES:  Allergies    No Known Allergies    Intolerances        LABS:                        9.3    17.2  )-----------( 389      ( 26 May 2018 06:09 )             28.6     05-26    137  |  98  |  8   ----------------------------<  112<H>  3.7   |  26  |  0.81    Ca    8.6      26 May 2018 06:09  Phos  2.2     05-26  Mg     1.8     05-26          CAPILLARY BLOOD GLUCOSE          RADIOLOGY & ADDITIONAL TESTS: Reviewed. SUBJECTIVE / INTERVAL HPI: Restarted PO medications. Patient seen and examined at bedside.     VITAL SIGNS:  Vital Signs Last 24 Hrs  T(C): 37.3 (26 May 2018 04:30), Max: 37.6 (25 May 2018 09:45)  T(F): 99.1 (26 May 2018 04:30), Max: 99.6 (25 May 2018 09:45)  HR: 80 (26 May 2018 04:30) (80 - 103)  BP: 147/83 (26 May 2018 04:30) (137/67 - 167/83)  BP(mean): --  RR: 17 (26 May 2018 04:30) (17 - 18)  SpO2: 95% (26 May 2018 04:30) (94% - 95%)    PHYSICAL EXAM:    General: WDWN, sitting up in a chair in NAD  HEENT: NC/AT; PERRL, clear conjunctiva  Neck: supple  Cardiovascular: +S1/S2; RRR  Respiratory: CTA b/l; no W/R/R  Gastrointestinal: soft, NT/ND; +BSx4, wound drain w/decreased sanguinous drainage   Extremities: WWP; 2+ peripheral pulses; no edema, compression socks in place  Neurological: AAOx3; no focal deficits    MEDICATIONS:  MEDICATIONS  (STANDING):  albumin human  5% IVPB 1000 milliLiter(s) IV Intermittent once  dolutegravir 50 milliGRAM(s) Oral daily  emtricitabine 200 mG/rilpivirine 25 mG/tenofovir 300 mG 1 Tablet(s) Oral with dinner  heparin  Injectable 5000 Unit(s) SubCutaneous every 8 hours  lactated ringers. 1000 milliLiter(s) (125 mL/Hr) IV Continuous <Continuous>    MEDICATIONS  (PRN):  HYDROmorphone  Injectable 0.5 milliGRAM(s) IV Push every 4 hours PRN Moderate Pain (4 - 6)  ondansetron Injectable 4 milliGRAM(s) IV Push every 6 hours PRN Nausea  oxyCODONE    5 mG/acetaminophen 325 mG 1 Tablet(s) Oral every 4 hours PRN Moderate Pain (4 - 6)  oxyCODONE    5 mG/acetaminophen 325 mG 2 Tablet(s) Oral every 4 hours PRN Severe Pain (7 - 10)      ALLERGIES:  Allergies    No Known Allergies    Intolerances        LABS:                        9.3    17.2  )-----------( 389      ( 26 May 2018 06:09 )             28.6     05-26    137  |  98  |  8   ----------------------------<  112<H>  3.7   |  26  |  0.81    Ca    8.6      26 May 2018 06:09  Phos  2.2     05-26  Mg     1.8     05-26          CAPILLARY BLOOD GLUCOSE          RADIOLOGY & ADDITIONAL TESTS: Reviewed. SUBJECTIVE / INTERVAL HPI: Restarted PO medications. Patient seen and examined at bedside. Pt reports feeling "great".    VITAL SIGNS:  Vital Signs Last 24 Hrs  T(C): 37.3 (26 May 2018 04:30), Max: 37.6 (25 May 2018 09:45)  T(F): 99.1 (26 May 2018 04:30), Max: 99.6 (25 May 2018 09:45)  HR: 80 (26 May 2018 04:30) (80 - 103)  BP: 147/83 (26 May 2018 04:30) (137/67 - 167/83)  BP(mean): --  RR: 17 (26 May 2018 04:30) (17 - 18)  SpO2: 95% (26 May 2018 04:30) (94% - 95%)    PHYSICAL EXAM:    General: WDWN, sitting up in a chair in NAD  HEENT: NC/AT; PERRL, clear conjunctiva  Neck: supple  Cardiovascular: +S1/S2; RRR  Respiratory: CTA b/l; no W/R/R  Gastrointestinal: soft, NT/ND; +BSx4, wound drain w/decreased sanguinous drainage   Extremities: WWP; 2+ peripheral pulses; no edema, compression socks in place  Neurological: AAOx3; no focal deficits    MEDICATIONS:  MEDICATIONS  (STANDING):  albumin human  5% IVPB 1000 milliLiter(s) IV Intermittent once  dolutegravir 50 milliGRAM(s) Oral daily  emtricitabine 200 mG/rilpivirine 25 mG/tenofovir 300 mG 1 Tablet(s) Oral with dinner  heparin  Injectable 5000 Unit(s) SubCutaneous every 8 hours  lactated ringers. 1000 milliLiter(s) (125 mL/Hr) IV Continuous <Continuous>    MEDICATIONS  (PRN):  HYDROmorphone  Injectable 0.5 milliGRAM(s) IV Push every 4 hours PRN Moderate Pain (4 - 6)  ondansetron Injectable 4 milliGRAM(s) IV Push every 6 hours PRN Nausea  oxyCODONE    5 mG/acetaminophen 325 mG 1 Tablet(s) Oral every 4 hours PRN Moderate Pain (4 - 6)  oxyCODONE    5 mG/acetaminophen 325 mG 2 Tablet(s) Oral every 4 hours PRN Severe Pain (7 - 10)      ALLERGIES:  Allergies    No Known Allergies    Intolerances        LABS:                        9.3    17.2  )-----------( 389      ( 26 May 2018 06:09 )             28.6     05-26    137  |  98  |  8   ----------------------------<  112<H>  3.7   |  26  |  0.81    Ca    8.6      26 May 2018 06:09  Phos  2.2     05-26  Mg     1.8     05-26          CAPILLARY BLOOD GLUCOSE          RADIOLOGY & ADDITIONAL TESTS: Reviewed.

## 2018-05-26 NOTE — PROGRESS NOTE ADULT - PROBLEM SELECTOR PLAN 7
now exlap, distal pancreatectomy and splenectomy  - plan as per primary team now exlap, distal pancreatectomy and splenectomy  - plan as per primary team  -recommend finger sticks q8 hours as patient is at high risk for diabetes given his chronic pancreatitis now w/partial pancreatectomy    -recommend restarting creon

## 2018-05-27 LAB
ANION GAP SERPL CALC-SCNC: 17 MMOL/L — SIGNIFICANT CHANGE UP (ref 5–17)
BUN SERPL-MCNC: 8 MG/DL — SIGNIFICANT CHANGE UP (ref 7–23)
CALCIUM SERPL-MCNC: 8.5 MG/DL — SIGNIFICANT CHANGE UP (ref 8.4–10.5)
CHLORIDE SERPL-SCNC: 95 MMOL/L — LOW (ref 96–108)
CO2 SERPL-SCNC: 25 MMOL/L — SIGNIFICANT CHANGE UP (ref 22–31)
CREAT SERPL-MCNC: 0.86 MG/DL — SIGNIFICANT CHANGE UP (ref 0.5–1.3)
GLUCOSE SERPL-MCNC: 108 MG/DL — HIGH (ref 70–99)
HCT VFR BLD CALC: 27.2 % — LOW (ref 39–50)
HGB BLD-MCNC: 8.9 G/DL — LOW (ref 13–17)
MAGNESIUM SERPL-MCNC: 1.6 MG/DL — SIGNIFICANT CHANGE UP (ref 1.6–2.6)
MCHC RBC-ENTMCNC: 29.5 PG — SIGNIFICANT CHANGE UP (ref 27–34)
MCHC RBC-ENTMCNC: 32.7 G/DL — SIGNIFICANT CHANGE UP (ref 32–36)
MCV RBC AUTO: 90.1 FL — SIGNIFICANT CHANGE UP (ref 80–100)
PHOSPHATE SERPL-MCNC: 2.6 MG/DL — SIGNIFICANT CHANGE UP (ref 2.5–4.5)
PLATELET # BLD AUTO: 454 K/UL — HIGH (ref 150–400)
POTASSIUM SERPL-MCNC: 3.6 MMOL/L — SIGNIFICANT CHANGE UP (ref 3.5–5.3)
POTASSIUM SERPL-SCNC: 3.6 MMOL/L — SIGNIFICANT CHANGE UP (ref 3.5–5.3)
RBC # BLD: 3.02 M/UL — LOW (ref 4.2–5.8)
RBC # FLD: 14.5 % — SIGNIFICANT CHANGE UP (ref 10.3–16.9)
SODIUM SERPL-SCNC: 137 MMOL/L — SIGNIFICANT CHANGE UP (ref 135–145)
WBC # BLD: 14.9 K/UL — HIGH (ref 3.8–10.5)
WBC # FLD AUTO: 14.9 K/UL — HIGH (ref 3.8–10.5)

## 2018-05-27 PROCEDURE — 99232 SBSQ HOSP IP/OBS MODERATE 35: CPT

## 2018-05-27 RX ORDER — LIPASE/PROTEASE/AMYLASE 16-48-48K
1 CAPSULE,DELAYED RELEASE (ENTERIC COATED) ORAL
Qty: 0 | Refills: 0 | Status: DISCONTINUED | OUTPATIENT
Start: 2018-05-27 | End: 2018-06-05

## 2018-05-27 RX ORDER — POTASSIUM CHLORIDE 20 MEQ
10 PACKET (EA) ORAL
Qty: 0 | Refills: 0 | Status: DISCONTINUED | OUTPATIENT
Start: 2018-05-27 | End: 2018-05-27

## 2018-05-27 RX ORDER — MAGNESIUM SULFATE 500 MG/ML
2 VIAL (ML) INJECTION ONCE
Qty: 0 | Refills: 0 | Status: COMPLETED | OUTPATIENT
Start: 2018-05-27 | End: 2018-05-27

## 2018-05-27 RX ORDER — LIPASE/PROTEASE/AMYLASE 16-48-48K
36000 CAPSULE,DELAYED RELEASE (ENTERIC COATED) ORAL DAILY
Qty: 0 | Refills: 0 | Status: DISCONTINUED | OUTPATIENT
Start: 2018-05-27 | End: 2018-05-27

## 2018-05-27 RX ORDER — POTASSIUM CHLORIDE 20 MEQ
40 PACKET (EA) ORAL ONCE
Qty: 0 | Refills: 0 | Status: COMPLETED | OUTPATIENT
Start: 2018-05-27 | End: 2018-05-27

## 2018-05-27 RX ADMIN — Medication 40 MILLIEQUIVALENT(S): at 19:50

## 2018-05-27 RX ADMIN — OXYCODONE AND ACETAMINOPHEN 2 TABLET(S): 5; 325 TABLET ORAL at 06:11

## 2018-05-27 RX ADMIN — OXYCODONE AND ACETAMINOPHEN 2 TABLET(S): 5; 325 TABLET ORAL at 23:30

## 2018-05-27 RX ADMIN — EMTRICITABINE, RILPIVIRINE HYDROCHLORIDE, AND TENOFOVIR DISOPROXIL FUMARATE 1 TABLET(S): 200; 25; 300 TABLET, FILM COATED ORAL at 18:01

## 2018-05-27 RX ADMIN — Medication 1 CAPSULE(S): at 18:01

## 2018-05-27 RX ADMIN — HEPARIN SODIUM 5000 UNIT(S): 5000 INJECTION INTRAVENOUS; SUBCUTANEOUS at 22:31

## 2018-05-27 RX ADMIN — Medication 100 MILLIEQUIVALENT(S): at 18:00

## 2018-05-27 RX ADMIN — OXYCODONE AND ACETAMINOPHEN 2 TABLET(S): 5; 325 TABLET ORAL at 22:31

## 2018-05-27 RX ADMIN — Medication 50 GRAM(S): at 11:07

## 2018-05-27 RX ADMIN — HEPARIN SODIUM 5000 UNIT(S): 5000 INJECTION INTRAVENOUS; SUBCUTANEOUS at 14:07

## 2018-05-27 RX ADMIN — DOLUTEGRAVIR SODIUM 50 MILLIGRAM(S): 25 TABLET, FILM COATED ORAL at 13:11

## 2018-05-27 RX ADMIN — SODIUM CHLORIDE 125 MILLILITER(S): 9 INJECTION, SOLUTION INTRAVENOUS at 22:31

## 2018-05-27 RX ADMIN — SODIUM CHLORIDE 125 MILLILITER(S): 9 INJECTION, SOLUTION INTRAVENOUS at 06:09

## 2018-05-27 RX ADMIN — OXYCODONE AND ACETAMINOPHEN 2 TABLET(S): 5; 325 TABLET ORAL at 16:52

## 2018-05-27 RX ADMIN — OXYCODONE AND ACETAMINOPHEN 2 TABLET(S): 5; 325 TABLET ORAL at 07:11

## 2018-05-27 RX ADMIN — HEPARIN SODIUM 5000 UNIT(S): 5000 INJECTION INTRAVENOUS; SUBCUTANEOUS at 06:09

## 2018-05-27 RX ADMIN — OXYCODONE AND ACETAMINOPHEN 2 TABLET(S): 5; 325 TABLET ORAL at 17:22
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
## 2018-05-27 NOTE — PROVIDER CONTACT NOTE (OTHER) - ASSESSMENT
Follow up at pt's bedside to reassess patient's RUE for pain/edema/NV deficits. Edema decreased with warm compress. Full ROM and N/V intact.
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
## 2018-05-27 NOTE — PHYSICAL THERAPY INITIAL EVALUATION ADULT - GENERAL OBSERVATIONS, REHAB EVAL
Pt is a 71 yo M with HIV (last CD4 651, viral load undetectable, on Tivicay) and chronic pancreatitis with pancreatic insufficiency who presents with 2 weeks of worsening sharp left back pain radiating to LUQ abdominal pain and to left lower chest. Came in to ER tonight because pain felt like "exploding." Occasionally gets dizzy on exertion, but no nausea/vomiting, no fevers/chills, no jaundice, no diarrhea (actually has been constipated), no post-prandial, no other abdominal pain, no HA, no syncope.

## 2018-05-27 NOTE — PROGRESS NOTE ADULT - SUBJECTIVE AND OBJECTIVE BOX
Patient is a 72y old  Male who presents with a chief complaint of abd pain 2/2 pancreatitis and pancreatic pseudocyst s/p resection  Pt was febrile this morning, denies chest pain, cough, SOB, dysuria, chills, diarrhea.   Pt continues to have abdominal pain, reports good appetite, and is has been started on clear liquid diet, but is requesting oatmeal  denies headache, dizziness, musculoskeletal pain.     INTERVAL HPI/OVERNIGHT EVENTS:    Review of Systems: 12 point review of systems otherwise negative  ( - )fevers/chills  ( - ) dyspnea  ( - ) cough  ( - ) chest pain  ( - ) palpatations  ( - ) dizziness/lightheadedness  ( - ) nausea/vomiting  ( - ) abd pain  ( - ) diarrhea  ( - ) melena  ( - ) hematochezia  ( - ) dysuria  ( - ) hematuria  ( - ) leg swelling  ( -) calf tenderness  ( - ) motor weakness  ( - ) extremity numbness  ( - ) back pain  ( + ) tolerating POs  ( + ) BM    MEDICATIONS  (STANDING):  albumin human  5% IVPB 1000 milliLiter(s) IV Intermittent once  dolutegravir 50 milliGRAM(s) Oral daily  emtricitabine 200 mG/rilpivirine 25 mG/tenofovir 300 mG 1 Tablet(s) Oral with dinner  heparin  Injectable 5000 Unit(s) SubCutaneous every 8 hours  lactated ringers. 1000 milliLiter(s) (125 mL/Hr) IV Continuous <Continuous>  potassium chloride  10 mEq/100 mL IVPB 10 milliEquivalent(s) IV Intermittent every 1 hour    MEDICATIONS  (PRN):  HYDROmorphone  Injectable 0.5 milliGRAM(s) IV Push every 4 hours PRN Moderate Pain (4 - 6)  ondansetron Injectable 4 milliGRAM(s) IV Push every 6 hours PRN Nausea  oxyCODONE    5 mG/acetaminophen 325 mG 1 Tablet(s) Oral every 4 hours PRN Moderate Pain (4 - 6)  oxyCODONE    5 mG/acetaminophen 325 mG 2 Tablet(s) Oral every 4 hours PRN Severe Pain (7 - 10)      Allergies    No Known Allergies    Intolerances          Vital Signs Last 24 Hrs  T(C): 36.6 (27 May 2018 11:15), Max: 38.6 (27 May 2018 05:10)  T(F): 97.8 (27 May 2018 11:15), Max: 101.5 (27 May 2018 05:10)  HR: 83 (27 May 2018 11:52) (77 - 93)  BP: 125/63 (27 May 2018 11:52) (125/63 - 162/86)  BP(mean): --  RR: 16 (27 May 2018 11:15) (16 - 17)  SpO2: 95% (27 May 2018 11:15) (95% - 96%)  CAPILLARY BLOOD GLUCOSE          05-26 @ 07:01 - 05-27 @ 07:00  --------------------------------------------------------  IN: 3100 mL / OUT: 1322.5 mL / NET: 1777.5 mL    05-27 @ 07:01 - 05-27 @ 13:40  --------------------------------------------------------  IN: 0 mL / OUT: 400 mL / NET: -400 mL        Physical Exam:      General:  Well appearing, NAD, not cachetic  HEENT:  Nonicteric, PERRLA  CV:  RRR, no murmur, no JVD  Lungs:  CTA B/L, no wheezes, rales, rhonchi  Abdomen:  tender to palpation, but soft, normal bowel sounds  Extremities:  2+ pulses, no c/c, no edema  Skin:  Warm and dry, no rashes  :  No curiel  Neuro:  AAOx3, non-focal, CN II-XII grossly intact  No Restraints    LABS:                        8.9    14.9  )-----------( 454      ( 27 May 2018 07:16 )             27.2     05-27    137  |  95<L>  |  8   ----------------------------<  108<H>  3.6   |  25  |  0.86    Ca    8.5      27 May 2018 07:15  Phos  2.6     05-27  Mg     1.6     05-27

## 2018-05-28 LAB
ANION GAP SERPL CALC-SCNC: 15 MMOL/L — SIGNIFICANT CHANGE UP (ref 5–17)
APPEARANCE UR: CLEAR — SIGNIFICANT CHANGE UP
BILIRUB UR-MCNC: (no result)
BUN SERPL-MCNC: 9 MG/DL — SIGNIFICANT CHANGE UP (ref 7–23)
CALCIUM SERPL-MCNC: 8.5 MG/DL — SIGNIFICANT CHANGE UP (ref 8.4–10.5)
CHLORIDE SERPL-SCNC: 96 MMOL/L — SIGNIFICANT CHANGE UP (ref 96–108)
CO2 SERPL-SCNC: 25 MMOL/L — SIGNIFICANT CHANGE UP (ref 22–31)
COLOR SPEC: YELLOW — SIGNIFICANT CHANGE UP
CREAT SERPL-MCNC: 0.82 MG/DL — SIGNIFICANT CHANGE UP (ref 0.5–1.3)
DIFF PNL FLD: NEGATIVE — SIGNIFICANT CHANGE UP
GLUCOSE SERPL-MCNC: 129 MG/DL — HIGH (ref 70–99)
GLUCOSE UR QL: NEGATIVE — SIGNIFICANT CHANGE UP
HCT VFR BLD CALC: 28.4 % — LOW (ref 39–50)
HGB BLD-MCNC: 9.4 G/DL — LOW (ref 13–17)
KETONES UR-MCNC: 40 MG/DL
LEUKOCYTE ESTERASE UR-ACNC: NEGATIVE — SIGNIFICANT CHANGE UP
MAGNESIUM SERPL-MCNC: 2 MG/DL — SIGNIFICANT CHANGE UP (ref 1.6–2.6)
MCHC RBC-ENTMCNC: 29.9 PG — SIGNIFICANT CHANGE UP (ref 27–34)
MCHC RBC-ENTMCNC: 33.1 G/DL — SIGNIFICANT CHANGE UP (ref 32–36)
MCV RBC AUTO: 90.4 FL — SIGNIFICANT CHANGE UP (ref 80–100)
NITRITE UR-MCNC: NEGATIVE — SIGNIFICANT CHANGE UP
PH UR: 5.5 — SIGNIFICANT CHANGE UP (ref 5–8)
PHOSPHATE SERPL-MCNC: 2.3 MG/DL — LOW (ref 2.5–4.5)
PLATELET # BLD AUTO: 549 K/UL — HIGH (ref 150–400)
POTASSIUM SERPL-MCNC: 3.8 MMOL/L — SIGNIFICANT CHANGE UP (ref 3.5–5.3)
POTASSIUM SERPL-SCNC: 3.8 MMOL/L — SIGNIFICANT CHANGE UP (ref 3.5–5.3)
PROT UR-MCNC: 30 MG/DL
RBC # BLD: 3.14 M/UL — LOW (ref 4.2–5.8)
RBC # FLD: 14.7 % — SIGNIFICANT CHANGE UP (ref 10.3–16.9)
SODIUM SERPL-SCNC: 136 MMOL/L — SIGNIFICANT CHANGE UP (ref 135–145)
SP GR SPEC: 1.01 — SIGNIFICANT CHANGE UP (ref 1–1.03)
TROPONIN T SERPL-MCNC: <0.01 NG/ML — SIGNIFICANT CHANGE UP (ref 0–0.01)
UROBILINOGEN FLD QL: 0.2 E.U./DL — SIGNIFICANT CHANGE UP
WBC # BLD: 17.2 K/UL — HIGH (ref 3.8–10.5)
WBC # FLD AUTO: 17.2 K/UL — HIGH (ref 3.8–10.5)

## 2018-05-28 PROCEDURE — 74177 CT ABD & PELVIS W/CONTRAST: CPT | Mod: 26

## 2018-05-28 PROCEDURE — 99232 SBSQ HOSP IP/OBS MODERATE 35: CPT

## 2018-05-28 PROCEDURE — 71045 X-RAY EXAM CHEST 1 VIEW: CPT | Mod: 26

## 2018-05-28 RX ORDER — ACETAMINOPHEN 500 MG
500 TABLET ORAL ONCE
Qty: 0 | Refills: 0 | Status: COMPLETED | OUTPATIENT
Start: 2018-05-28 | End: 2018-05-28

## 2018-05-28 RX ORDER — PIPERACILLIN AND TAZOBACTAM 4; .5 G/20ML; G/20ML
3.38 INJECTION, POWDER, LYOPHILIZED, FOR SOLUTION INTRAVENOUS EVERY 6 HOURS
Qty: 0 | Refills: 0 | Status: DISCONTINUED | OUTPATIENT
Start: 2018-05-28 | End: 2018-05-28

## 2018-05-28 RX ORDER — ACETAMINOPHEN 500 MG
1000 TABLET ORAL ONCE
Qty: 0 | Refills: 0 | Status: COMPLETED | OUTPATIENT
Start: 2018-05-28 | End: 2018-05-28

## 2018-05-28 RX ORDER — PIPERACILLIN AND TAZOBACTAM 4; .5 G/20ML; G/20ML
3.38 INJECTION, POWDER, LYOPHILIZED, FOR SOLUTION INTRAVENOUS EVERY 6 HOURS
Qty: 0 | Refills: 0 | Status: DISCONTINUED | OUTPATIENT
Start: 2018-05-28 | End: 2018-06-04

## 2018-05-28 RX ORDER — SODIUM,POTASSIUM PHOSPHATES 278-250MG
1 POWDER IN PACKET (EA) ORAL ONCE
Qty: 0 | Refills: 0 | Status: COMPLETED | OUTPATIENT
Start: 2018-05-28 | End: 2018-05-28

## 2018-05-28 RX ADMIN — Medication 500 MILLIGRAM(S): at 06:09

## 2018-05-28 RX ADMIN — Medication 1 CAPSULE(S): at 17:39

## 2018-05-28 RX ADMIN — Medication 1 CAPSULE(S): at 07:57

## 2018-05-28 RX ADMIN — Medication 1 PACKET(S): at 10:07

## 2018-05-28 RX ADMIN — HYDROMORPHONE HYDROCHLORIDE 0.5 MILLIGRAM(S): 2 INJECTION INTRAMUSCULAR; INTRAVENOUS; SUBCUTANEOUS at 12:25

## 2018-05-28 RX ADMIN — EMTRICITABINE, RILPIVIRINE HYDROCHLORIDE, AND TENOFOVIR DISOPROXIL FUMARATE 1 TABLET(S): 200; 25; 300 TABLET, FILM COATED ORAL at 17:39

## 2018-05-28 RX ADMIN — Medication 1 CAPSULE(S): at 12:23

## 2018-05-28 RX ADMIN — OXYCODONE AND ACETAMINOPHEN 2 TABLET(S): 5; 325 TABLET ORAL at 02:48

## 2018-05-28 RX ADMIN — OXYCODONE AND ACETAMINOPHEN 2 TABLET(S): 5; 325 TABLET ORAL at 18:22

## 2018-05-28 RX ADMIN — HEPARIN SODIUM 5000 UNIT(S): 5000 INJECTION INTRAVENOUS; SUBCUTANEOUS at 06:00

## 2018-05-28 RX ADMIN — OXYCODONE AND ACETAMINOPHEN 2 TABLET(S): 5; 325 TABLET ORAL at 13:40

## 2018-05-28 RX ADMIN — HYDROMORPHONE HYDROCHLORIDE 0.5 MILLIGRAM(S): 2 INJECTION INTRAMUSCULAR; INTRAVENOUS; SUBCUTANEOUS at 12:09

## 2018-05-28 RX ADMIN — DOLUTEGRAVIR SODIUM 50 MILLIGRAM(S): 25 TABLET, FILM COATED ORAL at 12:23

## 2018-05-28 RX ADMIN — OXYCODONE AND ACETAMINOPHEN 2 TABLET(S): 5; 325 TABLET ORAL at 03:48

## 2018-05-28 RX ADMIN — OXYCODONE AND ACETAMINOPHEN 2 TABLET(S): 5; 325 TABLET ORAL at 07:55

## 2018-05-28 RX ADMIN — SODIUM CHLORIDE 125 MILLILITER(S): 9 INJECTION, SOLUTION INTRAVENOUS at 10:06

## 2018-05-28 RX ADMIN — HEPARIN SODIUM 5000 UNIT(S): 5000 INJECTION INTRAVENOUS; SUBCUTANEOUS at 12:49

## 2018-05-28 RX ADMIN — OXYCODONE AND ACETAMINOPHEN 2 TABLET(S): 5; 325 TABLET ORAL at 08:37

## 2018-05-28 RX ADMIN — Medication 400 MILLIGRAM(S): at 14:20

## 2018-05-28 RX ADMIN — SODIUM CHLORIDE 125 MILLILITER(S): 9 INJECTION, SOLUTION INTRAVENOUS at 23:25

## 2018-05-28 RX ADMIN — OXYCODONE AND ACETAMINOPHEN 2 TABLET(S): 5; 325 TABLET ORAL at 23:31

## 2018-05-28 RX ADMIN — PIPERACILLIN AND TAZOBACTAM 200 GRAM(S): 4; .5 INJECTION, POWDER, LYOPHILIZED, FOR SOLUTION INTRAVENOUS at 23:25

## 2018-05-28 RX ADMIN — OXYCODONE AND ACETAMINOPHEN 2 TABLET(S): 5; 325 TABLET ORAL at 17:39

## 2018-05-28 RX ADMIN — OXYCODONE AND ACETAMINOPHEN 2 TABLET(S): 5; 325 TABLET ORAL at 12:49

## 2018-05-28 RX ADMIN — HEPARIN SODIUM 5000 UNIT(S): 5000 INJECTION INTRAVENOUS; SUBCUTANEOUS at 22:06

## 2018-05-28 NOTE — PROGRESS NOTE ADULT - SUBJECTIVE AND OBJECTIVE BOX
Patient is a 72y old  Male who presents with a chief complaint of abd pain due to pancreatic pseudocyst, now s/p surgical management.   pt with continued fevers today, unclear etiology.   pt now reports left sided lower chest pain radiating to the back and worse with deep inspiration.   Denies dysuria, cough, SOB  reports continued abdominal pain, and chills     Review of Systems: 12 point review of systems otherwise negative  ( - )fevers/chills  ( - ) dyspnea  ( - ) cough  ( - ) chest pain  ( - ) palpatations  ( - ) dizziness/lightheadedness  ( - ) nausea/vomiting  ( - ) abd pain  ( - ) diarrhea  ( - ) melena  ( - ) hematochezia  ( - ) dysuria  ( - ) hematuria  ( - ) leg swelling  ( -) calf tenderness  ( - ) motor weakness  ( - ) extremity numbness  ( - ) back pain  ( + ) tolerating POs  ( + ) BM    MEDICATIONS  (STANDING):  albumin human  5% IVPB 1000 milliLiter(s) IV Intermittent once  amylase/lipase/protease  (CREON 36,000 Units) 1 Capsule(s) Oral three times a day with meals  dolutegravir 50 milliGRAM(s) Oral daily  emtricitabine 200 mG/rilpivirine 25 mG/tenofovir 300 mG 1 Tablet(s) Oral with dinner  heparin  Injectable 5000 Unit(s) SubCutaneous every 8 hours  lactated ringers. 1000 milliLiter(s) (125 mL/Hr) IV Continuous <Continuous>    MEDICATIONS  (PRN):  HYDROmorphone  Injectable 0.5 milliGRAM(s) IV Push every 4 hours PRN Moderate Pain (4 - 6)  ondansetron Injectable 4 milliGRAM(s) IV Push every 6 hours PRN Nausea  oxyCODONE    5 mG/acetaminophen 325 mG 1 Tablet(s) Oral every 4 hours PRN Moderate Pain (4 - 6)  oxyCODONE    5 mG/acetaminophen 325 mG 2 Tablet(s) Oral every 4 hours PRN Severe Pain (7 - 10)      Allergies    No Known Allergies    Intolerances          Vital Signs Last 24 Hrs  T(C): 38.4 (28 May 2018 12:43), Max: 38.6 (28 May 2018 05:57)  T(F): 101.1 (28 May 2018 12:43), Max: 101.4 (28 May 2018 05:57)  HR: 86 (28 May 2018 12:43) (86 - 97)  BP: 130/64 (28 May 2018 12:43) (101/58 - 135/70)  BP(mean): --  RR: 18 (28 May 2018 12:43) (16 - 18)  SpO2: 100% (28 May 2018 12:43) (92% - 100%)  CAPILLARY BLOOD GLUCOSE           @ 07:01  -   @ 07:00  --------------------------------------------------------  IN: 1862.5 mL / OUT: 1235 mL / NET: 627.5 mL        Physical Exam:    General:  Well appearing, NAD, not cachetic  HEENT:  Nonicteric, PERRLA  CV:  RRR, no murmur, no JVD  Lungs:  CTA B/L, no wheezes, rales, rhonchi  Abdomen:  increasing distention, hypoactive BS, tender to palpation  Extremities:  2+ pulses, no c/c, no edema  Skin:  Warm and dry, no rashes  :  No curiel  Neuro:  AAOx3, non-focal, CN II-XII grossly intact  No Restraints    LABS:                        9.4    17.2  )-----------( 549      ( 28 May 2018 07:25 )             28.4         136  |  96  |  9   ----------------------------<  129<H>  3.8   |  25  |  0.82    Ca    8.5      28 May 2018 07:25  Phos  2.3       Mg     2.0             Urinalysis Basic - ( 28 May 2018 14:24 )    Color: Yellow / Appearance: Clear / S.010 / pH: x  Gluc: x / Ketone: 40 mg/dL  / Bili: Small / Urobili: 0.2 E.U./dL   Blood: x / Protein: 30 mg/dL / Nitrite: NEGATIVE   Leuk Esterase: NEGATIVE / RBC: < 5 /HPF / WBC 5-10 /HPF   Sq Epi: x / Non Sq Epi: 0-5 /HPF / Bacteria: Present /HPF

## 2018-05-28 NOTE — CHART NOTE - NSCHARTNOTEFT_GEN_A_CORE
Alerted by RN that patient complaining of pain on respiration on the left side of chest. First time patient has had this complaint. Denies n/v. Patient started on O2 by NR with O2 on RA at 97%.     PE  VSS, nontachycardic  Gen: NAD  Chest: Left chest wall TTP from anterior to lateral aspect  Pulm: Mild pain with respiration    Likely costochondritis  EKG
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
## 2018-05-28 NOTE — PROGRESS NOTE ADULT - SUBJECTIVE AND OBJECTIVE BOX
INTERVAL HPI/OVERNIGHT EVENTS: RUE IV infiltrated, new IV placed in LUE, warm compress reduced edema of RUE, N/V intact    STATUS POST:  5/23: exploratory laparotomy, lysis of adhesions, distal pancreatectomy, splenectomy and segmental colon resection with primary anastomosis: creation of aditi-en-y with jejunal serosal patch of distal pancreas. 19 Fr mj left at pancreatic resection bed      SUBJECTIVE:  Flatus: [ ] YES [ ] NO             Bowel Movement: [ ] YES [ ] NO  Pain (0-10):            Pain Control Adequate: [ ] YES [ ] NO  Nausea: [ ] YES [ ] NO            Vomiting: [ ] YES [ ] NO  Diarrhea: [ ] YES [ ] NO         Constipation: [ ] YES [ ] NO     Chest Pain: [ ] YES [ ] NO    SOB:  [ ] YES [ ] NO    MEDICATIONS  (STANDING):  albumin human  5% IVPB 1000 milliLiter(s) IV Intermittent once  amylase/lipase/protease  (CREON 36,000 Units) 1 Capsule(s) Oral three times a day with meals  dolutegravir 50 milliGRAM(s) Oral daily  emtricitabine 200 mG/rilpivirine 25 mG/tenofovir 300 mG 1 Tablet(s) Oral with dinner  heparin  Injectable 5000 Unit(s) SubCutaneous every 8 hours  lactated ringers. 1000 milliLiter(s) (125 mL/Hr) IV Continuous <Continuous>    MEDICATIONS  (PRN):  HYDROmorphone  Injectable 0.5 milliGRAM(s) IV Push every 4 hours PRN Moderate Pain (4 - 6)  ondansetron Injectable 4 milliGRAM(s) IV Push every 6 hours PRN Nausea  oxyCODONE    5 mG/acetaminophen 325 mG 1 Tablet(s) Oral every 4 hours PRN Moderate Pain (4 - 6)  oxyCODONE    5 mG/acetaminophen 325 mG 2 Tablet(s) Oral every 4 hours PRN Severe Pain (7 - 10)      Vital Signs Last 24 Hrs  T(C): 38 (28 May 2018 02:45), Max: 38.2 (27 May 2018 22:30)  T(F): 100.4 (28 May 2018 02:45), Max: 100.8 (27 May 2018 22:30)  HR: 89 (28 May 2018 02:45) (77 - 89)  BP: 119/65 (28 May 2018 02:45) (119/65 - 126/67)  BP(mean): --  RR: 16 (28 May 2018 02:45) (16 - 16)  SpO2: 94% (28 May 2018 02:45) (92% - 95%)    PHYSICAL EXAM:      Constitutional: A&Ox3    Breasts:    Respiratory: non labored breathing, no respiratory distress    Cardiovascular: NSR, RRR    Gastrointestinal:                 Incision:    Genitourinary:    Extremities: (-) edema                  I&O's Detail    26 May 2018 07:01  -  27 May 2018 07:00  --------------------------------------------------------  IN:    lactated ringers.: 3000 mL    Oral Fluid: 100 mL  Total IN: 3100 mL    OUT:    Bulb: 22.5 mL    Voided: 1300 mL  Total OUT: 1322.5 mL    Total NET: 1777.5 mL      27 May 2018 07:01  -  28 May 2018 05:35  --------------------------------------------------------  IN:    lactated ringers.: 937.5 mL    Oral Fluid: 100 mL    Solution: 50 mL  Total IN: 1087.5 mL    OUT:    Bulb: 15 mL    Voided: 1200 mL  Total OUT: 1215 mL    Total NET: -127.5 mL          LABS:                        8.9    14.9  )-----------( 454      ( 27 May 2018 07:16 )             27.2     05-27    137  |  95<L>  |  8   ----------------------------<  108<H>  3.6   |  25  |  0.86    Ca    8.5      27 May 2018 07:15  Phos  2.6     05-27  Mg     1.6     05-27            RADIOLOGY & ADDITIONAL STUDIES: INTERVAL HPI/OVERNIGHT EVENTS: RUE IV infiltrated, new IV placed in LUE, warm compress reduced edema of RUE, N/V intact    STATUS POST:  5/23: exploratory laparotomy, lysis of adhesions, distal pancreatectomy, splenectomy and segmental colon resection with primary anastomosis: creation of aditi-en-y with jejunal serosal patch of distal pancreas. 19 Fr mj left at pancreatic resection bed       SUBJECTIVE: Patient seen and examined bedside by chief resident. Fever of 101.4 this AM. Patient reports feeling comfortable. Remaining vitals are wnl.    dolutegravir 50 milliGRAM(s) Oral daily  emtricitabine 200 mG/rilpivirine 25 mG/tenofovir 300 mG 1 Tablet(s) Oral with dinner  heparin  Injectable 5000 Unit(s) SubCutaneous every 8 hours      Vital Signs Last 24 Hrs  T(C): 38.6 (28 May 2018 05:57), Max: 38.6 (28 May 2018 05:57)  T(F): 101.4 (28 May 2018 05:57), Max: 101.4 (28 May 2018 05:57)  HR: 95 (28 May 2018 05:57) (77 - 95)  BP: 135/70 (28 May 2018 05:57) (119/65 - 135/70)  BP(mean): --  RR: 16 (28 May 2018 05:57) (16 - 16)  SpO2: 93% (28 May 2018 05:57) (92% - 95%)  I&O's Detail    27 May 2018 07:01  -  28 May 2018 07:00  --------------------------------------------------------  IN:    lactated ringers.: 1562.5 mL    Oral Fluid: 150 mL    Solution: 50 mL  Total IN: 1762.5 mL    OUT:    Bulb: 35 mL    Voided: 1200 mL  Total OUT: 1235 mL    Total NET: 527.5 mL          General: NAD, resting comfortably in bed  C/V: NSR  Pulm: Nonlabored breathing, no respiratory distress  Abd: soft, NT/ND. Incisions are clean, dry, intact. Staple line is well-approximated and without any drainage.  Extrem: WWP, no edema, SCDs in place        LABS:                        8.9    14.9  )-----------( 454      ( 27 May 2018 07:16 )             27.2     05-27    137  |  95<L>  |  8   ----------------------------<  108<H>  3.6   |  25  |  0.86    Ca    8.5      27 May 2018 07:15  Phos  2.6     05-27  Mg     1.6     05-27            RADIOLOGY & ADDITIONAL STUDIES:

## 2018-05-29 DIAGNOSIS — B20 HUMAN IMMUNODEFICIENCY VIRUS [HIV] DISEASE: ICD-10-CM

## 2018-05-29 DIAGNOSIS — R50.9 FEVER, UNSPECIFIED: ICD-10-CM

## 2018-05-29 DIAGNOSIS — I10 ESSENTIAL (PRIMARY) HYPERTENSION: ICD-10-CM

## 2018-05-29 LAB
-  CANDIDA ALBICANS: SIGNIFICANT CHANGE UP
-  CANDIDA GLABRATA: SIGNIFICANT CHANGE UP
-  CANDIDA KRUSEI: SIGNIFICANT CHANGE UP
-  CANDIDA PARAPSILOSIS: SIGNIFICANT CHANGE UP
-  CANDIDA TROPICALIS: SIGNIFICANT CHANGE UP
-  COAGULASE NEGATIVE STAPHYLOCOCCUS: SIGNIFICANT CHANGE UP
-  K. PNEUMONIAE GROUP: SIGNIFICANT CHANGE UP
-  KPC RESISTANCE GENE: SIGNIFICANT CHANGE UP
-  STREPTOCOCCUS SP. (NOT GRP A, B OR S PNEUMONIAE): SIGNIFICANT CHANGE UP
A BAUMANNII DNA SPEC QL NAA+PROBE: SIGNIFICANT CHANGE UP
ANION GAP SERPL CALC-SCNC: 12 MMOL/L — SIGNIFICANT CHANGE UP (ref 5–17)
BUN SERPL-MCNC: 10 MG/DL — SIGNIFICANT CHANGE UP (ref 7–23)
CALCIUM SERPL-MCNC: 8.8 MG/DL — SIGNIFICANT CHANGE UP (ref 8.4–10.5)
CHLORIDE SERPL-SCNC: 96 MMOL/L — SIGNIFICANT CHANGE UP (ref 96–108)
CO2 SERPL-SCNC: 29 MMOL/L — SIGNIFICANT CHANGE UP (ref 22–31)
CREAT SERPL-MCNC: 1.01 MG/DL — SIGNIFICANT CHANGE UP (ref 0.5–1.3)
E CLOAC COMP DNA BLD POS QL NAA+PROBE: SIGNIFICANT CHANGE UP
E COLI DNA BLD POS QL NAA+NON-PROBE: SIGNIFICANT CHANGE UP
ENTEROCOC DNA BLD POS QL NAA+NON-PROBE: SIGNIFICANT CHANGE UP
ENTEROCOC DNA BLD POS QL NAA+NON-PROBE: SIGNIFICANT CHANGE UP
GLUCOSE SERPL-MCNC: 133 MG/DL — HIGH (ref 70–99)
GP B STREP DNA BLD POS QL NAA+NON-PROBE: SIGNIFICANT CHANGE UP
GRAM STN FLD: SIGNIFICANT CHANGE UP
HAEM INFLU DNA BLD POS QL NAA+NON-PROBE: SIGNIFICANT CHANGE UP
HCT VFR BLD CALC: 27 % — LOW (ref 39–50)
HGB BLD-MCNC: 8.8 G/DL — LOW (ref 13–17)
K OXYTOCA DNA BLD POS QL NAA+NON-PROBE: SIGNIFICANT CHANGE UP
L MONOCYTOG DNA BLD POS QL NAA+NON-PROBE: SIGNIFICANT CHANGE UP
MAGNESIUM SERPL-MCNC: 2 MG/DL — SIGNIFICANT CHANGE UP (ref 1.6–2.6)
MCHC RBC-ENTMCNC: 29.4 PG — SIGNIFICANT CHANGE UP (ref 27–34)
MCHC RBC-ENTMCNC: 32.6 G/DL — SIGNIFICANT CHANGE UP (ref 32–36)
MCV RBC AUTO: 90.3 FL — SIGNIFICANT CHANGE UP (ref 80–100)
METHOD TYPE: SIGNIFICANT CHANGE UP
MRSA SPEC QL CULT: SIGNIFICANT CHANGE UP
MSSA DNA SPEC QL NAA+PROBE: SIGNIFICANT CHANGE UP
N MEN ISLT CULT: SIGNIFICANT CHANGE UP
P AERUGINOSA DNA BLD POS NAA+NON-PROBE: SIGNIFICANT CHANGE UP
PHOSPHATE SERPL-MCNC: 3.3 MG/DL — SIGNIFICANT CHANGE UP (ref 2.5–4.5)
PLATELET # BLD AUTO: 578 K/UL — HIGH (ref 150–400)
POTASSIUM SERPL-MCNC: 4.2 MMOL/L — SIGNIFICANT CHANGE UP (ref 3.5–5.3)
POTASSIUM SERPL-SCNC: 4.2 MMOL/L — SIGNIFICANT CHANGE UP (ref 3.5–5.3)
PROTEUS SP DNA BLD POS QL NAA+NON-PROBE: SIGNIFICANT CHANGE UP
RBC # BLD: 2.99 M/UL — LOW (ref 4.2–5.8)
RBC # FLD: 14.7 % — SIGNIFICANT CHANGE UP (ref 10.3–16.9)
S MARCESCENS DNA BLD POS NAA+NON-PROBE: SIGNIFICANT CHANGE UP
S PNEUM DNA BLD POS QL NAA+NON-PROBE: SIGNIFICANT CHANGE UP
S PYO DNA BLD POS QL NAA+NON-PROBE: SIGNIFICANT CHANGE UP
SODIUM SERPL-SCNC: 137 MMOL/L — SIGNIFICANT CHANGE UP (ref 135–145)
SURGICAL PATHOLOGY STUDY: SIGNIFICANT CHANGE UP
WBC # BLD: 22.6 K/UL — HIGH (ref 3.8–10.5)
WBC # FLD AUTO: 22.6 K/UL — HIGH (ref 3.8–10.5)

## 2018-05-29 PROCEDURE — 99233 SBSQ HOSP IP/OBS HIGH 50: CPT

## 2018-05-29 PROCEDURE — 10160 PNXR ASPIR ABSC HMTMA BULLA: CPT

## 2018-05-29 PROCEDURE — 76942 ECHO GUIDE FOR BIOPSY: CPT | Mod: 26

## 2018-05-29 RX ORDER — DOCUSATE SODIUM 100 MG
100 CAPSULE ORAL
Qty: 0 | Refills: 0 | Status: DISCONTINUED | OUTPATIENT
Start: 2018-05-29 | End: 2018-06-05

## 2018-05-29 RX ADMIN — PIPERACILLIN AND TAZOBACTAM 200 GRAM(S): 4; .5 INJECTION, POWDER, LYOPHILIZED, FOR SOLUTION INTRAVENOUS at 12:37

## 2018-05-29 RX ADMIN — OXYCODONE AND ACETAMINOPHEN 2 TABLET(S): 5; 325 TABLET ORAL at 00:30

## 2018-05-29 RX ADMIN — OXYCODONE AND ACETAMINOPHEN 2 TABLET(S): 5; 325 TABLET ORAL at 21:00

## 2018-05-29 RX ADMIN — PIPERACILLIN AND TAZOBACTAM 200 GRAM(S): 4; .5 INJECTION, POWDER, LYOPHILIZED, FOR SOLUTION INTRAVENOUS at 05:25

## 2018-05-29 RX ADMIN — DOLUTEGRAVIR SODIUM 50 MILLIGRAM(S): 25 TABLET, FILM COATED ORAL at 12:37

## 2018-05-29 RX ADMIN — HEPARIN SODIUM 5000 UNIT(S): 5000 INJECTION INTRAVENOUS; SUBCUTANEOUS at 05:25

## 2018-05-29 RX ADMIN — PIPERACILLIN AND TAZOBACTAM 200 GRAM(S): 4; .5 INJECTION, POWDER, LYOPHILIZED, FOR SOLUTION INTRAVENOUS at 17:00

## 2018-05-29 RX ADMIN — OXYCODONE AND ACETAMINOPHEN 2 TABLET(S): 5; 325 TABLET ORAL at 04:15

## 2018-05-29 RX ADMIN — OXYCODONE AND ACETAMINOPHEN 2 TABLET(S): 5; 325 TABLET ORAL at 05:15

## 2018-05-29 RX ADMIN — Medication 1 CAPSULE(S): at 12:37

## 2018-05-29 RX ADMIN — EMTRICITABINE, RILPIVIRINE HYDROCHLORIDE, AND TENOFOVIR DISOPROXIL FUMARATE 1 TABLET(S): 200; 25; 300 TABLET, FILM COATED ORAL at 18:00

## 2018-05-29 RX ADMIN — OXYCODONE AND ACETAMINOPHEN 2 TABLET(S): 5; 325 TABLET ORAL at 14:42

## 2018-05-29 RX ADMIN — Medication 1 CAPSULE(S): at 07:54

## 2018-05-29 RX ADMIN — HEPARIN SODIUM 5000 UNIT(S): 5000 INJECTION INTRAVENOUS; SUBCUTANEOUS at 21:00

## 2018-05-29 RX ADMIN — HEPARIN SODIUM 5000 UNIT(S): 5000 INJECTION INTRAVENOUS; SUBCUTANEOUS at 13:29

## 2018-05-29 RX ADMIN — Medication 1 CAPSULE(S): at 18:00

## 2018-05-29 RX ADMIN — OXYCODONE AND ACETAMINOPHEN 2 TABLET(S): 5; 325 TABLET ORAL at 22:00

## 2018-05-29 RX ADMIN — OXYCODONE AND ACETAMINOPHEN 2 TABLET(S): 5; 325 TABLET ORAL at 15:37

## 2018-05-29 NOTE — PROGRESS NOTE ADULT - PROBLEM SELECTOR PLAN 3
Patient slightly hypotensive, with active infection would not be aggressive with BP control.  Continue to monitor.

## 2018-05-29 NOTE — PROGRESS NOTE ADULT - SUBJECTIVE AND OBJECTIVE BOX
O/N: pt started on Zosyn as per chief for the UQ small abscess, CT results: large amount of inflammation along left side of the abd with several foci of free intraperitoneal air likely post operative, 3.2cm peripherally enhancing collection in the left UG consistent with a small abscess, NEW small left and trace right pleural effusions  5/28 day: fever workup done. CXR, UA, BCx. PT recommends BRANDON. Increased WBC to 17.2 from 14.9, left sided chest pain on respiration, EKG NSR, trop neg, TTP on left side, hematoma in abdominal muscle seen on CT, febrile to 101 tylenol given OVERNIGHT EVENTS: pt started on Zosyn as per chief for the UQ small abscess, CT results: large amount of inflammation along left side of the abd with several foci of free intraperitoneal air likely post operative, 3.2cm peripherally enhancing collection in the left UG consistent with a small abscess, NEW small left and trace right pleural effusions   day: fever workup done. CXR, UA, BCx. PT recommends BRANDON. Increased WBC to 17.2 from 14.9, left sided chest pain on respiration, EKG NSR, trop neg, TTP on left side, hematoma in abdominal muscle seen on CT, febrile to 101 Tylenol given      STATUS POST:  exploratory laparotomy, lysis of adhesions, distal pancreatectomy, splenectomy and segmental colon resection with primary anastomosis: creation of aditi-en-y with jejunal serosal patch of distal pancreas. 19 Fr mj left at pancreatic resection bed    POST OPERATIVE DAY #: 6    SUBJECTIVE: Patient examined bedside complains of incisional pain    Flatus: [] YES [X] NO             Bowel Movement: [ ] YES [ X] NO  Pain (0-10):            Pain Control Adequate: [ X] YES [ ] NO  Nausea: [ ] YES [ X] NO            Vomiting: [ ] YES [X ] NO  Diarrhea: [ ] YES [X ] NO         Constipation: [ ] YES [ X] NO     Chest Pain: [ ] YES [X ] NO    SOB:  [ ] YES [X ] NO    dolutegravir 50 milliGRAM(s) Oral daily  emtricitabine 200 mG/rilpivirine 25 mG/tenofovir 300 mG 1 Tablet(s) Oral with dinner  heparin  Injectable 5000 Unit(s) SubCutaneous every 8 hours  piperacillin/tazobactam IVPB. 3.375 Gram(s) IV Intermittent every 6 hours      Vital Signs Last 24 Hrs  T(C): 37.4 (29 May 2018 04:21), Max: 38.4 (28 May 2018 12:43)  T(F): 99.3 (29 May 2018 04:21), Max: 101.1 (28 May 2018 12:43)  HR: 102 (29 May 2018 04:21) (86 - 102)  BP: 95/54 (29 May 2018 04:21) (95/54 - 130/68)  BP(mean): 54 (28 May 2018 15:48) (54 - 54)  RR: 17 (29 May 2018 04:21) (16 - 20)  SpO2: 98% (29 May 2018 04:21) (94% - 100%)  I&O's Detail    28 May 2018 07:01  -  29 May 2018 07:00  --------------------------------------------------------  IN:    lactated ringers.: 2815 mL    Oral Fluid: 1000 mL    Solution: 200 mL  Total IN: 4015 mL    OUT:    Bulb: 35 mL    Voided: 1115 mL  Total OUT: 1150 mL    Total NET: 2865 mL          General: NAD, resting comfortably in bed  C/V: NSR  Pulm: Nonlabored breathing, no respiratory distress  Abd: Soft, non- distended, TTP around incision site, incision clean dry and intact   Extrem: WWP, no edema, SCDs in place        LABS:                        9.4    17.2  )-----------( 549      ( 28 May 2018 07:25 )             28.4         136  |  96  |  9   ----------------------------<  129<H>  3.8   |  25  |  0.82    Ca    8.5      28 May 2018 07:25  Phos  2.3       Mg     2.0             Urinalysis Basic - ( 28 May 2018 14:24 )    Color: Yellow / Appearance: Clear / S.010 / pH: x  Gluc: x / Ketone: 40 mg/dL  / Bili: Small / Urobili: 0.2 E.U./dL   Blood: x / Protein: 30 mg/dL / Nitrite: NEGATIVE   Leuk Esterase: NEGATIVE / RBC: < 5 /HPF / WBC 5-10 /HPF   Sq Epi: x / Non Sq Epi: 0-5 /HPF / Bacteria: Present /HPF

## 2018-05-29 NOTE — PROGRESS NOTE ADULT - PROBLEM SELECTOR PLAN 1
Persistent, Tmax 101.1 past 24h, likely abdomina source has no urinary or respiratory complaint.  Blood culture grew gram negative melania, which should be covered by zosyn.  Continue with Zosyn 3.375 Q6H.  Would follow up with speciation, would repeat blood culture. Persistent, Tmax 101.1 past 24h, likely abdomina source has no urinary or respiratory complaint.  Blood culture grew gram negative melania, which should be covered by zosyn.  Continue with Zosyn 3.375 Q6H.  Would follow up with speciation, would repeat blood culture today for clearance. Pt is septic with gram neg bacteremia and infected intraabd fluid collection/abscess  Persistent, Tmax 101.1 past 24h, likely abdominal source as has no urinary or respiratory complaints and worsening abd pain  Blood culture grew gram negative rods, which should be covered by zosyn.  Continue with Zosyn 3.375 Q6H.  Would follow up with speciation, would repeat blood culture today for clearance.  Would obtain would cx from fluid aspirated earlier

## 2018-05-29 NOTE — PROGRESS NOTE ADULT - SUBJECTIVE AND OBJECTIVE BOX
Pt examined at bedside. Stated pain now is 8-9/10, fell asleep with 2 percocets in the morning, stated percocets not helping much.  Denies feeling fever, no n/v.    V/S    T(F): 101.1 (05-29-18 @ 14:14), Max: 101.1 (05-29-18 @ 14:14)  HR: 94 (05-29-18 @ 14:14)  BP: 118/56 (05-29-18 @ 14:14)  RR: 16 (05-29-18 @ 14:14)  SpO2: 94% (05-29-18 @ 14:14)  Wt(kg): --  PE     GEN - Appears stated age. No distress.           HEENT - NCAT, EOMI, PERRLA, MMM           CVS - RRR, no M/R/G, no JVD           PULM - CTA B/L, equal air entry, no increased work of breathing           ABD - Soft, non distended, tender to palpation on the wound area, wound dressing on abdmonen intact, staples line intact, VALERIA draining browish fluid.           EXT - Distal extremities warm, no cyanosis, no edema.           NEURO - AOx3, Moves all extremities.     LAB                        8.8    22.6  )-----------( 578      ( 29 May 2018 07:51 )             27.0               05-29    137  |  96  |  10  ----------------------------<  133<H>  4.2   |  29  |  1.01    Ca    8.8      29 May 2018 07:51  Phos  3.3     05-29  Mg     2.0     05-29                                Additional tests & radiology reviewed.
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
## 2018-05-29 NOTE — PROGRESS NOTE ADULT - PROBLEM SELECTOR PLAN 2
S/p drainage, VALERIA drain intact draining brown fluid.  Management per primary team.  For his pain, would recommend Percocet Q4H PRN for moderate pain, 2x percocet Q4H PRN for severe pain, and 0.5mg IV Dilaudid for breakthrough. Would assess for somnolence prior to administer medication. S/p drainage, VALERIA drain intact draining brown fluid.  Management per primary team.  For his pain, would recommend Percocet Q4H PRN for moderate pain, 2x percocet Q4H PRN for severe pain, and 0.5mg IV Dilaudid for breakthrough. Would assess for somnolence prior to administer medication.  Cont with Creon

## 2018-05-29 NOTE — PROGRESS NOTE ADULT - PROBLEM SELECTOR PLAN 4
No sign of fluid overload, would hold diuretic at this point due to hypotension and active infection.

## 2018-05-30 LAB
ANION GAP SERPL CALC-SCNC: 12 MMOL/L — SIGNIFICANT CHANGE UP (ref 5–17)
ANION GAP SERPL CALC-SCNC: 15 MMOL/L — SIGNIFICANT CHANGE UP (ref 5–17)
ANION GAP SERPL CALC-SCNC: 15 MMOL/L — SIGNIFICANT CHANGE UP (ref 5–17)
BUN SERPL-MCNC: 6 MG/DL — LOW (ref 7–23)
BUN SERPL-MCNC: 7 MG/DL — SIGNIFICANT CHANGE UP (ref 7–23)
BUN SERPL-MCNC: 7 MG/DL — SIGNIFICANT CHANGE UP (ref 7–23)
CALCIUM SERPL-MCNC: 8.3 MG/DL — LOW (ref 8.4–10.5)
CALCIUM SERPL-MCNC: 8.3 MG/DL — LOW (ref 8.4–10.5)
CALCIUM SERPL-MCNC: 8.5 MG/DL — SIGNIFICANT CHANGE UP (ref 8.4–10.5)
CHLORIDE SERPL-SCNC: 93 MMOL/L — LOW (ref 96–108)
CHLORIDE SERPL-SCNC: 97 MMOL/L — SIGNIFICANT CHANGE UP (ref 96–108)
CHLORIDE SERPL-SCNC: 98 MMOL/L — SIGNIFICANT CHANGE UP (ref 96–108)
CO2 SERPL-SCNC: 25 MMOL/L — SIGNIFICANT CHANGE UP (ref 22–31)
CO2 SERPL-SCNC: 28 MMOL/L — SIGNIFICANT CHANGE UP (ref 22–31)
CO2 SERPL-SCNC: 28 MMOL/L — SIGNIFICANT CHANGE UP (ref 22–31)
CREAT SERPL-MCNC: 0.89 MG/DL — SIGNIFICANT CHANGE UP (ref 0.5–1.3)
CREAT SERPL-MCNC: 0.93 MG/DL — SIGNIFICANT CHANGE UP (ref 0.5–1.3)
CREAT SERPL-MCNC: 0.94 MG/DL — SIGNIFICANT CHANGE UP (ref 0.5–1.3)
GLUCOSE SERPL-MCNC: 100 MG/DL — HIGH (ref 70–99)
GLUCOSE SERPL-MCNC: 119 MG/DL — HIGH (ref 70–99)
GLUCOSE SERPL-MCNC: 87 MG/DL — SIGNIFICANT CHANGE UP (ref 70–99)
HCT VFR BLD CALC: 24.6 % — LOW (ref 39–50)
HGB BLD-MCNC: 8 G/DL — LOW (ref 13–17)
MAGNESIUM SERPL-MCNC: 2 MG/DL — SIGNIFICANT CHANGE UP (ref 1.6–2.6)
MCHC RBC-ENTMCNC: 29.2 PG — SIGNIFICANT CHANGE UP (ref 27–34)
MCHC RBC-ENTMCNC: 32.5 G/DL — SIGNIFICANT CHANGE UP (ref 32–36)
MCV RBC AUTO: 89.8 FL — SIGNIFICANT CHANGE UP (ref 80–100)
PHOSPHATE SERPL-MCNC: 2.8 MG/DL — SIGNIFICANT CHANGE UP (ref 2.5–4.5)
PLATELET # BLD AUTO: 574 K/UL — HIGH (ref 150–400)
POTASSIUM SERPL-MCNC: 3.4 MMOL/L — LOW (ref 3.5–5.3)
POTASSIUM SERPL-MCNC: 3.8 MMOL/L — SIGNIFICANT CHANGE UP (ref 3.5–5.3)
POTASSIUM SERPL-MCNC: 4 MMOL/L — SIGNIFICANT CHANGE UP (ref 3.5–5.3)
POTASSIUM SERPL-SCNC: 3.4 MMOL/L — LOW (ref 3.5–5.3)
POTASSIUM SERPL-SCNC: 3.8 MMOL/L — SIGNIFICANT CHANGE UP (ref 3.5–5.3)
POTASSIUM SERPL-SCNC: 4 MMOL/L — SIGNIFICANT CHANGE UP (ref 3.5–5.3)
RBC # BLD: 2.74 M/UL — LOW (ref 4.2–5.8)
RBC # FLD: 15 % — SIGNIFICANT CHANGE UP (ref 10.3–16.9)
SODIUM SERPL-SCNC: 133 MMOL/L — LOW (ref 135–145)
SODIUM SERPL-SCNC: 137 MMOL/L — SIGNIFICANT CHANGE UP (ref 135–145)
SODIUM SERPL-SCNC: 141 MMOL/L — SIGNIFICANT CHANGE UP (ref 135–145)
WBC # BLD: 17.1 K/UL — HIGH (ref 3.8–10.5)
WBC # FLD AUTO: 17.1 K/UL — HIGH (ref 3.8–10.5)

## 2018-05-30 PROCEDURE — 74177 CT ABD & PELVIS W/CONTRAST: CPT | Mod: 26

## 2018-05-30 PROCEDURE — 99233 SBSQ HOSP IP/OBS HIGH 50: CPT

## 2018-05-30 RX ORDER — OXYCODONE AND ACETAMINOPHEN 5; 325 MG/1; MG/1
2 TABLET ORAL EVERY 4 HOURS
Qty: 0 | Refills: 0 | Status: DISCONTINUED | OUTPATIENT
Start: 2018-05-30 | End: 2018-06-02

## 2018-05-30 RX ORDER — DEXTROSE MONOHYDRATE, SODIUM CHLORIDE, AND POTASSIUM CHLORIDE 50; .745; 4.5 G/1000ML; G/1000ML; G/1000ML
1000 INJECTION, SOLUTION INTRAVENOUS
Qty: 0 | Refills: 0 | Status: DISCONTINUED | OUTPATIENT
Start: 2018-05-30 | End: 2018-06-05

## 2018-05-30 RX ORDER — HYDROMORPHONE HYDROCHLORIDE 2 MG/ML
0.5 INJECTION INTRAMUSCULAR; INTRAVENOUS; SUBCUTANEOUS EVERY 4 HOURS
Qty: 0 | Refills: 0 | Status: DISCONTINUED | OUTPATIENT
Start: 2018-05-30 | End: 2018-06-05

## 2018-05-30 RX ORDER — DIATRIZOATE MEGLUMINE 180 MG/ML
30 INJECTION, SOLUTION INTRAVESICAL ONCE
Qty: 0 | Refills: 0 | Status: COMPLETED | OUTPATIENT
Start: 2018-05-30 | End: 2018-05-30

## 2018-05-30 RX ORDER — POTASSIUM CHLORIDE 20 MEQ
10 PACKET (EA) ORAL
Qty: 0 | Refills: 0 | Status: COMPLETED | OUTPATIENT
Start: 2018-05-30 | End: 2018-05-30

## 2018-05-30 RX ADMIN — PIPERACILLIN AND TAZOBACTAM 200 GRAM(S): 4; .5 INJECTION, POWDER, LYOPHILIZED, FOR SOLUTION INTRAVENOUS at 05:32

## 2018-05-30 RX ADMIN — SODIUM CHLORIDE 125 MILLILITER(S): 9 INJECTION, SOLUTION INTRAVENOUS at 04:35

## 2018-05-30 RX ADMIN — OXYCODONE AND ACETAMINOPHEN 2 TABLET(S): 5; 325 TABLET ORAL at 07:44

## 2018-05-30 RX ADMIN — OXYCODONE AND ACETAMINOPHEN 2 TABLET(S): 5; 325 TABLET ORAL at 22:17

## 2018-05-30 RX ADMIN — HEPARIN SODIUM 5000 UNIT(S): 5000 INJECTION INTRAVENOUS; SUBCUTANEOUS at 21:19

## 2018-05-30 RX ADMIN — PIPERACILLIN AND TAZOBACTAM 200 GRAM(S): 4; .5 INJECTION, POWDER, LYOPHILIZED, FOR SOLUTION INTRAVENOUS at 13:18

## 2018-05-30 RX ADMIN — HEPARIN SODIUM 5000 UNIT(S): 5000 INJECTION INTRAVENOUS; SUBCUTANEOUS at 14:03

## 2018-05-30 RX ADMIN — OXYCODONE AND ACETAMINOPHEN 2 TABLET(S): 5; 325 TABLET ORAL at 06:44

## 2018-05-30 RX ADMIN — OXYCODONE AND ACETAMINOPHEN 2 TABLET(S): 5; 325 TABLET ORAL at 21:18

## 2018-05-30 RX ADMIN — Medication 100 MILLIEQUIVALENT(S): at 11:23

## 2018-05-30 RX ADMIN — OXYCODONE AND ACETAMINOPHEN 2 TABLET(S): 5; 325 TABLET ORAL at 01:40

## 2018-05-30 RX ADMIN — PIPERACILLIN AND TAZOBACTAM 200 GRAM(S): 4; .5 INJECTION, POWDER, LYOPHILIZED, FOR SOLUTION INTRAVENOUS at 00:43

## 2018-05-30 RX ADMIN — DEXTROSE MONOHYDRATE, SODIUM CHLORIDE, AND POTASSIUM CHLORIDE 100 MILLILITER(S): 50; .745; 4.5 INJECTION, SOLUTION INTRAVENOUS at 13:00

## 2018-05-30 RX ADMIN — HEPARIN SODIUM 5000 UNIT(S): 5000 INJECTION INTRAVENOUS; SUBCUTANEOUS at 06:44

## 2018-05-30 RX ADMIN — DIATRIZOATE MEGLUMINE 30 MILLILITER(S): 180 INJECTION, SOLUTION INTRAVESICAL at 17:04

## 2018-05-30 RX ADMIN — OXYCODONE AND ACETAMINOPHEN 2 TABLET(S): 5; 325 TABLET ORAL at 02:05

## 2018-05-30 RX ADMIN — PIPERACILLIN AND TAZOBACTAM 200 GRAM(S): 4; .5 INJECTION, POWDER, LYOPHILIZED, FOR SOLUTION INTRAVENOUS at 21:19

## 2018-05-30 RX ADMIN — EMTRICITABINE, RILPIVIRINE HYDROCHLORIDE, AND TENOFOVIR DISOPROXIL FUMARATE 1 TABLET(S): 200; 25; 300 TABLET, FILM COATED ORAL at 21:18

## 2018-05-30 RX ADMIN — Medication 100 MILLIGRAM(S): at 06:44

## 2018-05-30 RX ADMIN — DOLUTEGRAVIR SODIUM 50 MILLIGRAM(S): 25 TABLET, FILM COATED ORAL at 13:00

## 2018-05-30 NOTE — PROGRESS NOTE ADULT - PROBLEM SELECTOR PLAN 2
S/p drainage, VALERIA drain intact draining brown fluid.  Management per primary team.  For his pain, would recommend Percocet Q4H PRN for moderate pain, 2x percocet Q4H PRN for severe pain, and 0.5mg IV Dilaudid for breakthrough. Would assess for somnolence prior to administer medication.  Cont with Creon

## 2018-05-30 NOTE — PROVIDER CONTACT NOTE (OTHER) - SITUATION
Team paged 2/2 to RUE IV infiltrated. New IV was placed at E under US guidance.
Pt missed 6pm zosyn dose because he was at CT.

## 2018-05-30 NOTE — PROGRESS NOTE ADULT - PROBLEM SELECTOR PLAN 3
Patient slightly hypotensive yesterday, with active infection would not be aggressive with BP control.  Continue to monitor.
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
## 2018-05-30 NOTE — PROGRESS NOTE ADULT - SUBJECTIVE AND OBJECTIVE BOX
O/N: soap jeniffer enema ordered for no bm in 2 days, colace 2x daily, blood cultures drawn   5/29: Temp 101.1  IR reports there was no fluid to drain . Gram negative rods growing from BCx. OVERNIGHT EVENTS: soap jeniffer enema ordered for no bm in 2 days, colace 2x daily, blood cultures drawn   : Temp 101.1  IR reports there was no fluid to drain . Gram negative rods growing from BCx.      STATUS POST:  exploratory laparotomy, lysis of adhesions, distal pancreatectomy, splenectomy and segmental colon resection with primary anastomosis: creation of aditi-en-y with jejunal serosal patch of distal pancreas. 19 Fr mj left at pancreatic resection bed      SUBJECTIVE: Patient examined bedside complains of incisional pain    Flatus: [] YES [X] NO             Bowel Movement: [ ] YES [ X] NO  Pain (0-10):            Pain Control Adequate: [X ] YES [ ] NO  Nausea: [ ] YES [X ] NO            Vomiting: [ ] YES [X ] NO  Diarrhea: [ ] YES [X ] NO         Constipation: [ ] YES [X ] NO     Chest Pain: [ ] YES [ X] NO    SOB:  [ ] YES [X ] NO    dolutegravir 50 milliGRAM(s) Oral daily  emtricitabine 200 mG/rilpivirine 25 mG/tenofovir 300 mG 1 Tablet(s) Oral with dinner  heparin  Injectable 5000 Unit(s) SubCutaneous every 8 hours  piperacillin/tazobactam IVPB. 3.375 Gram(s) IV Intermittent every 6 hours      Vital Signs Last 24 Hrs  T(C): 37.7 (30 May 2018 04:15), Max: 38.4 (29 May 2018 14:14)  T(F): 99.8 (30 May 2018 04:15), Max: 101.1 (29 May 2018 14:14)  HR: 82 (30 May 2018 04:15) (82 - 100)  BP: 101/55 (30 May 2018 04:15) (101/55 - 127/71)  BP(mean): --  RR: 16 (30 May 2018 04:15) (16 - 16)  SpO2: 94% (30 May 2018 04:15) (93% - 97%)  I&O's Detail    29 May 2018 07:01  -  30 May 2018 07:00  --------------------------------------------------------  IN:    lactated ringers.: 2500 mL    Oral Fluid: 900 mL  Total IN: 3400 mL    OUT:    Bulb: 27.5 mL    Voided: 1700 mL  Total OUT: 1727.5 mL    Total NET: 1672.5 mL          General: NAD, resting comfortably in bed  C/V: NSR  Pulm: Nonlabored breathing, no respiratory distress  Abd: Soft, non- distended, TTP around incision site, incision clean dry and intact   Extrem: WWP, no edema, SCDs in place      LABS:                        8.0    17.1  )-----------( 574      ( 30 May 2018 07:25 )             24.6     05-30    133<L>  |  93<L>  |  7   ----------------------------<  119<H>  3.4<L>   |  28  |  0.94    Ca    8.3<L>      30 May 2018 07:24  Phos  2.8     05-30  Mg     2.0     05-30        Urinalysis Basic - ( 28 May 2018 14:24 )    Color: Yellow / Appearance: Clear / S.010 / pH: x  Gluc: x / Ketone: 40 mg/dL  / Bili: Small / Urobili: 0.2 E.U./dL   Blood: x / Protein: 30 mg/dL / Nitrite: NEGATIVE   Leuk Esterase: NEGATIVE / RBC: < 5 /HPF / WBC 5-10 /HPF   Sq Epi: x / Non Sq Epi: 0-5 /HPF / Bacteria: Present /HPF

## 2018-05-30 NOTE — CHART NOTE - NSCHARTNOTEFT_GEN_A_CORE
Admitting Diagnosis:   Patient is a 72y old  Male who presents with a chief complaint of abd pain (22 May 2018 09:23)      PAST MEDICAL & SURGICAL HISTORY:  Asymptomatic human immunodeficiency virus infection: HIV (human immunodeficiency virus infection)  Chronic pancreatitis: Chronic pancreatitis  Cataract: Cataracts, bilateral      Current Nutrition Order: NPO except ice chips and sips of water    PO Intake: Good (%) [   ]  Fair (50-75%) [   ] Poor (<25%) [   ] N/A at present.  Prior to NPO diet order pt states he was consuming "lighter foods" like fruits and salads    GI Issues:  denies N/V/D/C.     Pain: No pain reported.     Skin Integrity: surgical incision    Labs:   05-30    133<L>  |  93<L>  |  7   ----------------------------<  119<H>  3.4<L>   |  28  |  0.94    Ca    8.3<L>      30 May 2018 07:24  Phos  2.8     05-30  Mg     2.0     05-30      CAPILLARY BLOOD GLUCOSE          Medications:  MEDICATIONS  (STANDING):  albumin human  5% IVPB 1000 milliLiter(s) IV Intermittent once  amylase/lipase/protease  (CREON 36,000 Units) 1 Capsule(s) Oral three times a day with meals  docusate sodium 100 milliGRAM(s) Oral two times a day  dolutegravir 50 milliGRAM(s) Oral daily  emtricitabine 200 mG/rilpivirine 25 mG/tenofovir 300 mG 1 Tablet(s) Oral with dinner  heparin  Injectable 5000 Unit(s) SubCutaneous every 8 hours  piperacillin/tazobactam IVPB. 3.375 Gram(s) IV Intermittent every 6 hours  potassium chloride  10 mEq/100 mL IVPB 10 milliEquivalent(s) IV Intermittent every 1 hour  sodium chloride 0.9% with potassium chloride 20 mEq/L 1000 milliLiter(s) (100 mL/Hr) IV Continuous <Continuous>    MEDICATIONS  (PRN):  HYDROmorphone  Injectable 0.5 milliGRAM(s) IV Push every 4 hours PRN Moderate Pain (4 - 6)  ondansetron Injectable 4 milliGRAM(s) IV Push every 6 hours PRN Nausea  oxyCODONE    5 mG/acetaminophen 325 mG 1 Tablet(s) Oral every 4 hours PRN Moderate Pain (4 - 6)  oxyCODONE    5 mG/acetaminophen 325 mG 2 Tablet(s) Oral every 4 hours PRN Severe Pain (7 - 10)      Weight:  212 Pound(s)  Height: 5'11", IBW 172lbs+/-10%, %%, BMI 29.6    Weight Change:   No new wts taken    Estimated energy needs:   IBW used for calculations as pt >120% of IBW   Nutrient needs based on [facility] standards of care for maintenance in older adults.   Needs adjusted for age and post-op  1560-1950kcal/day (20-25kcal/kg)  94-109g pro/day (1.2-1.4g/kg)  2340-2730ml fluid/day (30-35ml/kg)    Subjective:   s/p ex lap, distal pancreatectomy and splenectomy. Pt seen resting in bed. Currently NPO except ice chips. Prior to NPO diet order pt states he was consuming "lighter foods" like fruits and salads. Deneis N/V/D/C. last BM 4 days ago. Please advance diet to regular once medically feasible.     Previous Nutrition Diagnosis:  inadequate oral intake RT difficulty meeting needs on NPO diet order AEB pt meeting 0% estimated needs PO    Active [ x  ]  Resolved [   ]    If resolved, new PES:     Goal: Pt to meet >75% estimated needs PO    Recommendations:  1) advance diet back to regular once medically feasible  2) monitor tolerance at meals    Education: Edu provided at previous assessment    Risk Level: High [ x  ] Moderate [   ] Low [   ]

## 2018-05-30 NOTE — PROGRESS NOTE ADULT - SUBJECTIVE AND OBJECTIVE BOX
Pt seen and examined at bedside.  Less pain today in abd.  Does not have f/c, sleeping alot from narcotics.     INTERVAL HPI/OVERNIGHT EVENTS:    Review of Systems: 12 point review of systems otherwise negative    MEDICATIONS  (STANDING):  albumin human  5% IVPB 1000 milliLiter(s) IV Intermittent once  amylase/lipase/protease  (CREON 36,000 Units) 1 Capsule(s) Oral three times a day with meals  docusate sodium 100 milliGRAM(s) Oral two times a day  dolutegravir 50 milliGRAM(s) Oral daily  emtricitabine 200 mG/rilpivirine 25 mG/tenofovir 300 mG 1 Tablet(s) Oral with dinner  heparin  Injectable 5000 Unit(s) SubCutaneous every 8 hours  piperacillin/tazobactam IVPB. 3.375 Gram(s) IV Intermittent every 6 hours  sodium chloride 0.9% with potassium chloride 20 mEq/L 1000 milliLiter(s) (100 mL/Hr) IV Continuous <Continuous>    MEDICATIONS  (PRN):  HYDROmorphone  Injectable 0.5 milliGRAM(s) IV Push every 4 hours PRN Moderate Pain (4 - 6)  ondansetron Injectable 4 milliGRAM(s) IV Push every 6 hours PRN Nausea  oxyCODONE    5 mG/acetaminophen 325 mG 1 Tablet(s) Oral every 4 hours PRN Moderate Pain (4 - 6)  oxyCODONE    5 mG/acetaminophen 325 mG 2 Tablet(s) Oral every 4 hours PRN Severe Pain (7 - 10)      Allergies    No Known Allergies    Intolerances        Vital Signs Last 24 Hrs  T(C): 38 (30 May 2018 16:21), Max: 38.7 (30 May 2018 15:27)  T(F): 100.4 (30 May 2018 16:21), Max: 101.7 (30 May 2018 15:27)  HR: 92 (30 May 2018 16:21) (82 - 102)  BP: 148/69 (30 May 2018 16:21) (101/55 - 148/69)  BP(mean): --  RR: 17 (30 May 2018 16:21) (16 - 19)  SpO2: 97% (30 May 2018 16:21) (93% - 97%)  CAPILLARY BLOOD GLUCOSE          05-29 @ 07:01  -  05-30 @ 07:00  --------------------------------------------------------  IN: 3400 mL / OUT: 1727.5 mL / NET: 1672.5 mL    05-30 @ 07:01  -  05-30 @ 17:24  --------------------------------------------------------  IN: 0 mL / OUT: 1020 mL / NET: -1020 mL        LABS:                        8.0    17.1  )-----------( 574      ( 30 May 2018 07:25 )             24.6     05-30    137  |  97  |  7   ----------------------------<  87  4.0   |  25  |  0.89    Ca    8.5      30 May 2018 16:20  Phos  2.8     05-30  Mg     2.0     05-30          Culture Results:   No growth at 12 hours (05-30 @ 01:45)        RADIOLOGY & ADDITIONAL TESTS:    ---------------------------------------------------------------------------  I personally reviewed: [  ]EKG   [  ]CXR    [  ] CT    [  ]Other  ---------------------------------------------------------------------------

## 2018-05-30 NOTE — PROGRESS NOTE ADULT - PROBLEM SELECTOR PLAN 1
Pt is septic with gram neg bacteremia and infected intraabd fluid collection/abscess  Persistent, Tmax 101.1 past 24h, likely abdominal source as has no urinary or respiratory complaints and worsening abd pain  Blood culture grew gram negative rods, which should be covered by zosyn.  Continue with Zosyn 3.375 Q6H.  Would follow up with speciation, bcx repeated.  Would obtain wound cx from fluid aspirated earlier Pt is septic with gram neg bacteremia and infected intraabd fluid collection/abscess  Persistent, Tmax 101.7, likely abdominal source as has no urinary or respiratory complaints and worsening abd pain  Blood culture grew gram negative rods, which should be covered by zosyn.  Continue with Zosyn 3.375 Q6H.  Would follow up with speciation, bcx repeated today.  Would obtain wound cx from fluid aspirated earlier to help identify organism  If still spiking would add vancomycin

## 2018-05-30 NOTE — PROVIDER CONTACT NOTE (OTHER) - BACKGROUND
Pt has been having fevers and had extensive abd surgery, zosyn antibiotic had been being given q6hrs.

## 2018-05-30 NOTE — PROVIDER CONTACT NOTE (OTHER) - RECOMMENDATIONS
Continue to monitor. Use functioning IV with caution and only when necessary as pt is on a po diet.
To give zosyn dose now and reschedule from here after

## 2018-05-31 DIAGNOSIS — K85.90 ACUTE PANCREATITIS WITHOUT NECROSIS OR INFECTION, UNSPECIFIED: ICD-10-CM

## 2018-05-31 DIAGNOSIS — K65.1 PERITONEAL ABSCESS: ICD-10-CM

## 2018-05-31 LAB
ANION GAP SERPL CALC-SCNC: 15 MMOL/L — SIGNIFICANT CHANGE UP (ref 5–17)
BUN SERPL-MCNC: 6 MG/DL — LOW (ref 7–23)
CALCIUM SERPL-MCNC: 8.4 MG/DL — SIGNIFICANT CHANGE UP (ref 8.4–10.5)
CHLORIDE SERPL-SCNC: 99 MMOL/L — SIGNIFICANT CHANGE UP (ref 96–108)
CO2 SERPL-SCNC: 25 MMOL/L — SIGNIFICANT CHANGE UP (ref 22–31)
CREAT SERPL-MCNC: 0.93 MG/DL — SIGNIFICANT CHANGE UP (ref 0.5–1.3)
GLUCOSE SERPL-MCNC: 110 MG/DL — HIGH (ref 70–99)
HCT VFR BLD CALC: 23.1 % — LOW (ref 39–50)
HGB BLD-MCNC: 7.6 G/DL — LOW (ref 13–17)
MAGNESIUM SERPL-MCNC: 2 MG/DL — SIGNIFICANT CHANGE UP (ref 1.6–2.6)
MCHC RBC-ENTMCNC: 30 PG — SIGNIFICANT CHANGE UP (ref 27–34)
MCHC RBC-ENTMCNC: 32.9 G/DL — SIGNIFICANT CHANGE UP (ref 32–36)
MCV RBC AUTO: 91.3 FL — SIGNIFICANT CHANGE UP (ref 80–100)
PHOSPHATE SERPL-MCNC: 2.9 MG/DL — SIGNIFICANT CHANGE UP (ref 2.5–4.5)
PLATELET # BLD AUTO: 624 K/UL — HIGH (ref 150–400)
POTASSIUM SERPL-MCNC: 3.8 MMOL/L — SIGNIFICANT CHANGE UP (ref 3.5–5.3)
POTASSIUM SERPL-SCNC: 3.8 MMOL/L — SIGNIFICANT CHANGE UP (ref 3.5–5.3)
RBC # BLD: 2.53 M/UL — LOW (ref 4.2–5.8)
RBC # FLD: 15.2 % — SIGNIFICANT CHANGE UP (ref 10.3–16.9)
SODIUM SERPL-SCNC: 139 MMOL/L — SIGNIFICANT CHANGE UP (ref 135–145)
WBC # BLD: 14.1 K/UL — HIGH (ref 3.8–10.5)
WBC # FLD AUTO: 14.1 K/UL — HIGH (ref 3.8–10.5)

## 2018-05-31 PROCEDURE — 99233 SBSQ HOSP IP/OBS HIGH 50: CPT

## 2018-05-31 RX ADMIN — Medication 1 CAPSULE(S): at 18:08

## 2018-05-31 RX ADMIN — EMTRICITABINE, RILPIVIRINE HYDROCHLORIDE, AND TENOFOVIR DISOPROXIL FUMARATE 1 TABLET(S): 200; 25; 300 TABLET, FILM COATED ORAL at 18:08

## 2018-05-31 RX ADMIN — PIPERACILLIN AND TAZOBACTAM 200 GRAM(S): 4; .5 INJECTION, POWDER, LYOPHILIZED, FOR SOLUTION INTRAVENOUS at 18:09

## 2018-05-31 RX ADMIN — PIPERACILLIN AND TAZOBACTAM 200 GRAM(S): 4; .5 INJECTION, POWDER, LYOPHILIZED, FOR SOLUTION INTRAVENOUS at 11:57

## 2018-05-31 RX ADMIN — OXYCODONE AND ACETAMINOPHEN 2 TABLET(S): 5; 325 TABLET ORAL at 21:50

## 2018-05-31 RX ADMIN — HEPARIN SODIUM 5000 UNIT(S): 5000 INJECTION INTRAVENOUS; SUBCUTANEOUS at 21:09

## 2018-05-31 RX ADMIN — HEPARIN SODIUM 5000 UNIT(S): 5000 INJECTION INTRAVENOUS; SUBCUTANEOUS at 06:00

## 2018-05-31 RX ADMIN — DEXTROSE MONOHYDRATE, SODIUM CHLORIDE, AND POTASSIUM CHLORIDE 100 MILLILITER(S): 50; .745; 4.5 INJECTION, SOLUTION INTRAVENOUS at 18:09

## 2018-05-31 RX ADMIN — Medication 100 MILLIGRAM(S): at 06:01

## 2018-05-31 RX ADMIN — DOLUTEGRAVIR SODIUM 50 MILLIGRAM(S): 25 TABLET, FILM COATED ORAL at 11:57

## 2018-05-31 RX ADMIN — OXYCODONE AND ACETAMINOPHEN 2 TABLET(S): 5; 325 TABLET ORAL at 06:27

## 2018-05-31 RX ADMIN — DEXTROSE MONOHYDRATE, SODIUM CHLORIDE, AND POTASSIUM CHLORIDE 100 MILLILITER(S): 50; .745; 4.5 INJECTION, SOLUTION INTRAVENOUS at 03:38

## 2018-05-31 RX ADMIN — OXYCODONE AND ACETAMINOPHEN 2 TABLET(S): 5; 325 TABLET ORAL at 06:00

## 2018-05-31 RX ADMIN — Medication 100 MILLIGRAM(S): at 18:08

## 2018-05-31 RX ADMIN — PIPERACILLIN AND TAZOBACTAM 200 GRAM(S): 4; .5 INJECTION, POWDER, LYOPHILIZED, FOR SOLUTION INTRAVENOUS at 23:18

## 2018-05-31 RX ADMIN — HEPARIN SODIUM 5000 UNIT(S): 5000 INJECTION INTRAVENOUS; SUBCUTANEOUS at 14:03

## 2018-05-31 RX ADMIN — OXYCODONE AND ACETAMINOPHEN 2 TABLET(S): 5; 325 TABLET ORAL at 21:09

## 2018-05-31 RX ADMIN — PIPERACILLIN AND TAZOBACTAM 200 GRAM(S): 4; .5 INJECTION, POWDER, LYOPHILIZED, FOR SOLUTION INTRAVENOUS at 03:35

## 2018-05-31 NOTE — PROGRESS NOTE ADULT - SUBJECTIVE AND OBJECTIVE BOX
O/N: dressing changed, CT done  5/30: Temp 101.7 , possible IR drainage in the  AM, -->141, repeat CT abd/p ordered OVERNIGHT EVENTS: dressing changed, CT done  5/30: Temp 101.7 , possible IR drainage in the  AM, -->141, repeat CT abd/p ordered       STATUS POST:  exploratory laparotomy, lysis of adhesions, distal pancreatectomy, splenectomy and segmental colon resection with primary anastomosis: creation of aditi-en-y with jejunal serosal patch of distal pancreas. 19 Fr mj left at pancreatic resection bed    POST OPERATIVE DAY #: 8    SUBJECTIVE:  Flatus: [] YES [X] NO             Bowel Movement: [ ] YES [X ] NO  Pain (0-10):            Pain Control Adequate: [ X] YES [ ] NO  Nausea: [ ] YES [X ] NO            Vomiting: [ ] YES [ X] NO  Diarrhea: [ ] YES [ X] NO         Constipation: [ ] YES [ X] NO     Chest Pain: [ ] YES [ X] NO    SOB:  [ ] YES [X ] NO    dolutegravir 50 milliGRAM(s) Oral daily  emtricitabine 200 mG/rilpivirine 25 mG/tenofovir 300 mG 1 Tablet(s) Oral with dinner  heparin  Injectable 5000 Unit(s) SubCutaneous every 8 hours  piperacillin/tazobactam IVPB. 3.375 Gram(s) IV Intermittent every 6 hours      Vital Signs Last 24 Hrs  T(C): 37.5 (31 May 2018 04:00), Max: 38.7 (30 May 2018 15:27)  T(F): 99.5 (31 May 2018 04:00), Max: 101.7 (30 May 2018 15:27)  HR: 88 (31 May 2018 04:00) (88 - 97)  BP: 124/61 (31 May 2018 04:00) (101/50 - 148/69)  BP(mean): --  RR: 17 (31 May 2018 04:00) (16 - 18)  SpO2: 95% (31 May 2018 04:00) (95% - 97%)  I&O's Detail    30 May 2018 07:01  -  31 May 2018 07:00  --------------------------------------------------------  IN:    lactated ringers.: 500 mL    Oral Fluid: 30 mL    sodium chloride 0.9% with potassium chloride 20 mEq/L: 1100 mL    Solution: 200 mL  Total IN: 1830 mL    OUT:    Bulb: 50 mL    Voided: 2200 mL  Total OUT: 2250 mL    Total NET: -420 mL          General: NAD, resting comfortably in bed  C/V: NSR  Pulm: Nonlabored breathing, no respiratory distress  Abd: Soft, non- distended, TTP around incision site, incision drain serosanguineous fluid     Extrem: WWP, no edema, SCDs in place        LABS:                        8.0    17.1  )-----------( 574      ( 30 May 2018 07:25 )             24.6     05-30    141  |  98  |  6<L>  ----------------------------<  100<H>  3.8   |  28  |  0.93    Ca    8.3<L>      30 May 2018 17:23  Phos  2.8     05-30  Mg     2.0     05-30

## 2018-05-31 NOTE — PROGRESS NOTE ADULT - PROBLEM SELECTOR PLAN 1
Pt is septic with gram neg bacteremia and infected intraabd fluid collection/abscess  Persistent, Tmax 101.7, likely abdominal source as has no urinary or respiratory complaints and abd pain  Would add on Vancomycin.  Blood culture grew gram negative rods, which should be covered by zosyn.  Continue with Zosyn 3.375 Q6H.  Would follow up with speciation and repeat blood culture result.  Would obtain wound cx from fluid aspirated earlier to help identify organism Pt is septic with gram neg bacteremia and infected intraabd fluid collection/abscesses and colonic leak  Persistent, Tmax 101.7, likely abdominal source as per hx, exam, and CT  Would add on Vancomycin in setting of continued fevers  Blood culture grew gram negative rods, which should be covered by zosyn.  Continue with Zosyn 3.375 Q6H and f/u speciation  F/u wound cx  Pt would benefit form going to OR again to address infections above  Would follow up with speciation and repeat blood culture result.  Would obtain wound cx from fluid aspirated earlier to help identify organism

## 2018-05-31 NOTE — PROGRESS NOTE ADULT - SUBJECTIVE AND OBJECTIVE BOX
Interval Events: Reviewed  Patient seen and examined at bedside.    Patient is a 72y old  Male who presents with a chief complaint of abd pain (22 May 2018 09:23)      PAST MEDICAL & SURGICAL HISTORY:  Asymptomatic human immunodeficiency virus infection: HIV (human immunodeficiency virus infection)  Chronic pancreatitis: Chronic pancreatitis  Cataract: Cataracts, bilateral      MEDICATIONS:  Pulmonary:    Antimicrobials:  dolutegravir 50 milliGRAM(s) Oral daily  emtricitabine 200 mG/rilpivirine 25 mG/tenofovir 300 mG 1 Tablet(s) Oral with dinner  piperacillin/tazobactam IVPB. 3.375 Gram(s) IV Intermittent every 6 hours    Anticoagulants:  heparin  Injectable 5000 Unit(s) SubCutaneous every 8 hours    Cardiac:      Allergies    No Known Allergies    Intolerances        Vital Signs Last 24 Hrs  T(C): 37.9 (31 May 2018 20:57), Max: 37.9 (31 May 2018 20:57)  T(F): 100.3 (31 May 2018 20:57), Max: 100.3 (31 May 2018 20:57)  HR: 85 (31 May 2018 20:57) (71 - 92)  BP: 150/70 (31 May 2018 20:57) (101/50 - 150/70)  BP(mean): --  RR: 16 (31 May 2018 20:57) (15 - 17)  SpO2: 97% (31 May 2018 20:57) (95% - 97%)    05-30 @ 07:01  -  05-31 @ 07:00  --------------------------------------------------------  IN: 1830 mL / OUT: 2250 mL / NET: -420 mL    05-31 @ 07:01  -  05-31 @ 22:31  --------------------------------------------------------  IN: 2130 mL / OUT: 1206.5 mL / NET: 923.5 mL          LABS:      CBC Full  -  ( 31 May 2018 11:07 )  WBC Count : 14.1 K/uL  Hemoglobin : 7.6 g/dL  Hematocrit : 23.1 %  Platelet Count - Automated : 624 K/uL  Mean Cell Volume : 91.3 fL  Mean Cell Hemoglobin : 30.0 pg  Mean Cell Hemoglobin Concentration : 32.9 g/dL  Auto Neutrophil # : x  Auto Lymphocyte # : x  Auto Monocyte # : x  Auto Eosinophil # : x  Auto Basophil # : x  Auto Neutrophil % : x  Auto Lymphocyte % : x  Auto Monocyte % : x  Auto Eosinophil % : x  Auto Basophil % : x    05-31    139  |  99  |  6<L>  ----------------------------<  110<H>  3.8   |  25  |  0.93    Ca    8.4      31 May 2018 11:07  Phos  2.9     05-31  Mg     2.0     05-31    < from: CT Abdomen and Pelvis w/ Oral Cont and w/ IV Cont (05.30.18 @ 20:40) >  EXAM:  CT ABDOMEN AND PELVIS OC IC                          PROCEDURE DATE:  05/30/2018          INTERPRETATION:  CT of the ABDOMEN and PELVIS with intravenous contrast   dated 5/30/2018 8:40 PM    INDICATION: Fever.    TECHNIQUE: CT of the abdomenand pelvis was performed using oral and   intravenous contrast. Axial, sagittal and coronal images were produced   and reviewed.    PRIOR STUDIES: CT abdomen and pelvis 5/28/2018.    FINDINGS: Images of the lower chest demonstrate small left pleural   effusion, slightly increased in size, with overlying passive atelectasis.    The liver is normal in appearance.  Single radiopaque gallstone is noted   at the gallbladder fundus.  There is no dilatation of the intra or   extrahepatic biliary system.    The patient is again status post post distal pancreatectomy and   splenectomy. There is again a surgical drain with the tip along the   posterior aspect of the left upper quadrant in the posterior   subdiaphragmatic space. There is again a small pneumoperitoneum. 4.5 x   3.1 x 4 cm collection in the bed of pancreatic tail has slightly   enlarged. There is a 3.5 x 2.9 x 2.5 cm air and fluid collection in the   left paracolic gutter. Postoperative changes are again seen in the small   and largebowel in the left side of the abdomen. There is leakage of oral   contrast from the descending colon in the left mid abdomen inferior to   the above-mentioned paracolic air and fluid collection. Surgical drain   passes through the extraluminal contrast. There is no evidence of bowel   obstruction. Mesenteric and retroperitoneal edema is again noted in the   left side of the abdomen.    The adrenal glands are unremarkable. Two subcentimeter hypodensities in   the right kidney that are too small tocharacterize.  No hydronephrosis.     No abdominal aortic aneurysm is seen. No lymphadenopathy is seen.       Images of the pelvis demonstrate an enlarged prostate. Decreased air in   the urinary bladder. Penile prosthesis in place.    Evaluation of the osseous structures demonstrates mild degenerative   changes of the spine.      IMPRESSION:    Extraluminal oral contrast leaking from the descending colon in the left   mid abdomen.    3.5 cm air-fluid collection in the left paracolic gutter likely an   Culture - Surgical Swab (05.23.18 @ 16:51)    Gram Stain:   No organisms seen  Rare White blood cells    Specimen Source: .Surgical Swab pancreatic pseudocyst or spec    Culture Results:   No growth    abscess.    4.5 cm fluid collection in the bed of pancreatic tail, slightly enlarged   since prior study.    < end of copied text >        < from: Echocardiogram (05.23.18 @ 08:16) >  EXAM:  ECHOCARDIOGRAM (CARDIOL)                          PROCEDURE DATE:  05/23/2018          INTERPRETATION:  Patient Height: 180.0 cm  Patient Weight: 96.0 kg  Systolic Pressure: 129 mmHg  Diastolic Pressure: 71 mmHg  BSA: 2.2 m^2  InterpretationSummary  Normal left ventricular size and wall thickness.The left ventricular wall   motion is normal.The left ventricular ejection fraction is estimated to   be   65-70%The left atrial size is normal.The mitral inflow pattern is   consistent   with impaired left ventricular relaxation with mildly elevated left   atrial   pressure (8-14mmHg).  Right atrial size is normal.The right ventricle is   normal in size and function.Structurally normal aortic valve.No aortic   regurgitation noted.Structurally normal mitral valve.No mitral   regurgitation   noted.Structurally normal tricuspid valve.There is no echocardiographic   evidence for pulmonary hypertension.Structurally normal pulmonic valve.No   aortic root dilatation.The inferior vena cava is normal in size (<2.1   cm) with   normal inspiratory collapse (>50%) consistent with normal right atrial   pressure.  There is no pericardial effusion.  Procedure Details    < end of copied text >              Culture Results:   No growth at 1 day. (05-30 @ 01:45)      Culture - Blood (05.28.18 @ 08:17)    -  Multidrug (KPC pos) resistant organism: Nondet    -  Staphylococcus aureus: Nondet    -  Methicillin resistant Staphylococcus aureus (MRSA): Nondet    -  Coagulase negative Staphylococcus: Nondet    -  Enterococcus species: Nondet    -  Vancomycin resistant Enterococcus sp.: Nondet    -  Escherichia coli: Nondet    -  Klebsiella oxytoca: Nondet    -  Klebsiella pneumoniae: Nondet    -  Serratia marcescens: Nondet    -  Proteus species: Nondet    -  Haemophilus influenzae: Nondet    -  Listeria monocytogenes: Nondet    -  Neisseria meningitidis: Nondet    -  Pseudomonas aeruginosa: Nondet    -  Acinetobacter baumanii: Nondet    -  Enterobacter cloacae complex: Nondet    -  Streptococcus sp. (Not Grp A, B or S pneumoniae): Nondet    -  Streptococcus agalactiae (Group B): Nondet    -  Streptococcus pyogenes (Group A): Nondet    -  Streptococcus pneumoniae: Nondet    -  Candida albicans: Nondet    -  Candida glabrata: Nondet    -  Candida krusei: Nondet    -  Candida parapsilosis: Nondet    -  Candida tropicalis: Nondet    Gram Stain:   Anaerobic Bottle: Gram Negative Rods  Result called to and read back byJovan Carballo RN 05/29/2018 13:32:13  ***Blood Panel PCR results on this specimen are available  approximately 3 hours after the Gram stain result.***  Gram stain, PCR, and/or culture results may not always  correspond due to difference in methodologies.  ************************************************************  This PCR assay was performed using [employer].  The following targets are tested for: Enterococcus,  vancomycin resistant enterococci, Listeria monocytogenes,  coagulase negative staphylococci, S. aureus,  methicillin resistant S. aureus, Streptococcus agalactiae  (Group B), S. pneumoniae, S. pyogenes (Group A),  Acinetobacter baumannii, Enterobacter cloacae, E. coli,  Klebsiella oxytoca, K. pneumoniae, Proteus sp.,  Serratia marcescens, Haemophilus influenzae,  Neisseria meningitidis, Pseudomonas aeruginosa, Candida  albicans, C. glabrata, C krusei, C parapsilosis,  C. tropicalis and the KPC resistance gene.  "Due to technical problems, Proteus sp. will Not be reported as part of  the BCID panel until further notice"    Specimen Source: .Blood Blood    Organism: Blood Culture PCR    Culture Results:   Growth in anaerobic bottle: Bacteroides thetaiotaomicron  Susceptibility to follow.    Organism Identification: Blood Culture PCR    Method Type: PCR    RADIOLOGY & ADDITIONAL STUDIES (The following images were personally reviewed):  Christensen:                                     No  Urine output:                       adequate  DVT prophylaxis:                 Yes  Flattus:                                  Yes  Bowel movement:              No

## 2018-05-31 NOTE — PROGRESS NOTE ADULT - PROBLEM SELECTOR PLAN 1
cultures and repeat CT scan of abdomen reviewed.  I will discuss with Dr Starr and will follow on cultures.  Continue antibiotics.
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
## 2018-05-31 NOTE — PROGRESS NOTE ADULT - SUBJECTIVE AND OBJECTIVE BOX
Pt examined at bedside.   Abdominal pain is now 8-9/10, no respiratory and urinary complaint.    V/S    T(F): 99.5 (05-31-18 @ 04:00), Max: 101.7 (05-30-18 @ 15:27)  HR: 88 (05-31-18 @ 04:00)  BP: 124/61 (05-31-18 @ 04:00)  RR: 17 (05-31-18 @ 04:00)  SpO2: 95% (05-31-18 @ 04:00)  Wt(kg): --  PE     GEN - Appears stated age. No distress.           HEENT - NCAT, EOMI, PERRLA, MMM           CVS - RRR, no M/R/G, no JVD           PULM - CTA B/L, equal air entry, no increased work of breathing           ABD - Soft, tender to palpation on VALERIA drain site, draining brownish fluid.           EXT - Distal extremities warm, no cyanosis, no edema.           NEURO - AOx3, Moves all extremities.     LAB                        8.0    17.1  )-----------( 574      ( 30 May 2018 07:25 )             24.6               05-30    141  |  98  |  6<L>  ----------------------------<  100<H>  3.8   |  28  |  0.93    Ca    8.3<L>      30 May 2018 17:23  Phos  2.8     05-30  Mg     2.0     05-30                                Additional tests & radiology reviewed.

## 2018-05-31 NOTE — PROGRESS NOTE ADULT - PROBLEM SELECTOR PLAN 2
S/p drainage, VALERIA drain intact draining brown fluid.  Management per primary team.  For his pain, continue Percocet Q4H PRN for moderate pain, 2x percocet Q4H PRN for severe pain, and 0.5mg IV Dilaudid for breakthrough. Would assess for somnolence prior to administer medication.  Cont with Creon

## 2018-05-31 NOTE — PROGRESS NOTE ADULT - PROBLEM SELECTOR PLAN 3
Normotensive this AM. With active infection would not be aggressive with BP control.  Continue to monitor.

## 2018-06-01 DIAGNOSIS — Q89.01 ASPLENIA (CONGENITAL): ICD-10-CM

## 2018-06-01 LAB
ANION GAP SERPL CALC-SCNC: 17 MMOL/L — SIGNIFICANT CHANGE UP (ref 5–17)
BUN SERPL-MCNC: 6 MG/DL — LOW (ref 7–23)
CALCIUM SERPL-MCNC: 8.3 MG/DL — LOW (ref 8.4–10.5)
CHLORIDE SERPL-SCNC: 99 MMOL/L — SIGNIFICANT CHANGE UP (ref 96–108)
CO2 SERPL-SCNC: 22 MMOL/L — SIGNIFICANT CHANGE UP (ref 22–31)
CREAT SERPL-MCNC: 0.94 MG/DL — SIGNIFICANT CHANGE UP (ref 0.5–1.3)
GLUCOSE SERPL-MCNC: 116 MG/DL — HIGH (ref 70–99)
GRAM STN FLD: SIGNIFICANT CHANGE UP
HCT VFR BLD CALC: 23.6 % — LOW (ref 39–50)
HGB BLD-MCNC: 7.6 G/DL — LOW (ref 13–17)
MAGNESIUM SERPL-MCNC: 2 MG/DL — SIGNIFICANT CHANGE UP (ref 1.6–2.6)
MCHC RBC-ENTMCNC: 29.3 PG — SIGNIFICANT CHANGE UP (ref 27–34)
MCHC RBC-ENTMCNC: 32.2 G/DL — SIGNIFICANT CHANGE UP (ref 32–36)
MCV RBC AUTO: 91.1 FL — SIGNIFICANT CHANGE UP (ref 80–100)
PHOSPHATE SERPL-MCNC: 2.4 MG/DL — LOW (ref 2.5–4.5)
PLATELET # BLD AUTO: 667 K/UL — HIGH (ref 150–400)
POTASSIUM SERPL-MCNC: 3.8 MMOL/L — SIGNIFICANT CHANGE UP (ref 3.5–5.3)
POTASSIUM SERPL-SCNC: 3.8 MMOL/L — SIGNIFICANT CHANGE UP (ref 3.5–5.3)
RBC # BLD: 2.59 M/UL — LOW (ref 4.2–5.8)
RBC # FLD: 15.2 % — SIGNIFICANT CHANGE UP (ref 10.3–16.9)
SODIUM SERPL-SCNC: 138 MMOL/L — SIGNIFICANT CHANGE UP (ref 135–145)
SPECIMEN SOURCE: SIGNIFICANT CHANGE UP
WBC # BLD: 10.4 K/UL — SIGNIFICANT CHANGE UP (ref 3.8–10.5)
WBC # FLD AUTO: 10.4 K/UL — SIGNIFICANT CHANGE UP (ref 3.8–10.5)

## 2018-06-01 PROCEDURE — 99233 SBSQ HOSP IP/OBS HIGH 50: CPT

## 2018-06-01 RX ORDER — TETANUS TOXOID, REDUCED DIPHTHERIA TOXOID AND ACELLULAR PERTUSSIS VACCINE, ADSORBED 5; 2.5; 8; 8; 2.5 [IU]/.5ML; [IU]/.5ML; UG/.5ML; UG/.5ML; UG/.5ML
0.5 SUSPENSION INTRAMUSCULAR ONCE
Qty: 0 | Refills: 0 | Status: COMPLETED | OUTPATIENT
Start: 2018-06-01 | End: 2018-06-01

## 2018-06-01 RX ORDER — INFLUENZA VIRUS VACCINE 15; 15; 15; 15 UG/.5ML; UG/.5ML; UG/.5ML; UG/.5ML
0.5 SUSPENSION INTRAMUSCULAR ONCE
Qty: 0 | Refills: 0 | Status: COMPLETED | OUTPATIENT
Start: 2018-06-01 | End: 2018-06-01

## 2018-06-01 RX ORDER — HAEMOPH B POLYSAC CONJ-MENING 7.5MCG/0.5
0.5 VIAL (ML) INTRAMUSCULAR ONCE
Qty: 0 | Refills: 0 | Status: COMPLETED | OUTPATIENT
Start: 2018-06-01 | End: 2018-06-01

## 2018-06-01 RX ORDER — NEISSERIA MENINGITIDIS GROUP A CAPSULAR POLYSACCHARIDE ANTIGEN, NEISSERIA MENINGITIDIS GROUP C CAPSULAR POLYSACCHARIDE ANTIGEN, NEISSERIA MENINGITIDIS GROUP Y CAPSULAR POLYSACCHARIDE ANTIGEN AND NEISSERIA MENINGITIDIS GROUP W-135 CAPSULAR POLYSACCHARIDE ANTIGEN 50 MCG
0.5 KIT SUBCUTANEOUS ONCE
Qty: 0 | Refills: 0 | Status: DISCONTINUED | OUTPATIENT
Start: 2018-06-01 | End: 2018-06-01

## 2018-06-01 RX ORDER — SODIUM,POTASSIUM PHOSPHATES 278-250MG
1 POWDER IN PACKET (EA) ORAL ONCE
Qty: 0 | Refills: 0 | Status: COMPLETED | OUTPATIENT
Start: 2018-06-01 | End: 2018-06-01

## 2018-06-01 RX ORDER — PNEUMOCOCCAL 13-VALENT CONJUGATE VACCINE 2.2; 2.2; 2.2; 2.2; 2.2; 4.4; 2.2; 2.2; 2.2; 2.2; 2.2; 2.2; 2.2 UG/.5ML; UG/.5ML; UG/.5ML; UG/.5ML; UG/.5ML; UG/.5ML; UG/.5ML; UG/.5ML; UG/.5ML; UG/.5ML; UG/.5ML; UG/.5ML; UG/.5ML
0.5 INJECTION, SUSPENSION INTRAMUSCULAR ONCE
Qty: 0 | Refills: 0 | Status: COMPLETED | OUTPATIENT
Start: 2018-06-01 | End: 2018-06-01

## 2018-06-01 RX ORDER — NEISSERIA MENINGITIDIS GROUP A CAPSULAR POLYSACCHARIDE TETANUS TOXOID CONJUGATE ANTIGEN, NEISSERIA MENINGITIDIS GROUP C CAPSULAR POLYSACCHARIDE TETANUS TOXOID CONJUGATE ANTIGEN, NEISSERIA MENINGITIDIS GROUP Y CAPSULAR POLYSACCHARIDE TETANUS TOXOID CONJUGATE ANTIGEN, AND NEISSERIA MENINGITIDIS GROUP W-135 CAPSULAR POLYSACCHARIDE TETANUS TOXOID CONJUGATE ANTIGEN 10; 10; 10; 10 UG/.5ML; UG/.5ML; UG/.5ML; UG/.5ML
0.5 INJECTION, SOLUTION INTRAMUSCULAR ONCE
Qty: 0 | Refills: 0 | Status: DISCONTINUED | OUTPATIENT
Start: 2018-06-01 | End: 2018-06-01

## 2018-06-01 RX ADMIN — Medication 1 PACKET(S): at 08:53

## 2018-06-01 RX ADMIN — OXYCODONE AND ACETAMINOPHEN 2 TABLET(S): 5; 325 TABLET ORAL at 04:11

## 2018-06-01 RX ADMIN — Medication 1 CAPSULE(S): at 07:56

## 2018-06-01 RX ADMIN — PIPERACILLIN AND TAZOBACTAM 200 GRAM(S): 4; .5 INJECTION, POWDER, LYOPHILIZED, FOR SOLUTION INTRAVENOUS at 09:53

## 2018-06-01 RX ADMIN — DOLUTEGRAVIR SODIUM 50 MILLIGRAM(S): 25 TABLET, FILM COATED ORAL at 12:05

## 2018-06-01 RX ADMIN — OXYCODONE AND ACETAMINOPHEN 2 TABLET(S): 5; 325 TABLET ORAL at 05:00

## 2018-06-01 RX ADMIN — HEPARIN SODIUM 5000 UNIT(S): 5000 INJECTION INTRAVENOUS; SUBCUTANEOUS at 15:42

## 2018-06-01 RX ADMIN — DEXTROSE MONOHYDRATE, SODIUM CHLORIDE, AND POTASSIUM CHLORIDE 100 MILLILITER(S): 50; .745; 4.5 INJECTION, SOLUTION INTRAVENOUS at 17:10

## 2018-06-01 RX ADMIN — EMTRICITABINE, RILPIVIRINE HYDROCHLORIDE, AND TENOFOVIR DISOPROXIL FUMARATE 1 TABLET(S): 200; 25; 300 TABLET, FILM COATED ORAL at 17:06

## 2018-06-01 RX ADMIN — DEXTROSE MONOHYDRATE, SODIUM CHLORIDE, AND POTASSIUM CHLORIDE 100 MILLILITER(S): 50; .745; 4.5 INJECTION, SOLUTION INTRAVENOUS at 06:42

## 2018-06-01 RX ADMIN — HEPARIN SODIUM 5000 UNIT(S): 5000 INJECTION INTRAVENOUS; SUBCUTANEOUS at 06:29

## 2018-06-01 RX ADMIN — OXYCODONE AND ACETAMINOPHEN 2 TABLET(S): 5; 325 TABLET ORAL at 21:10

## 2018-06-01 RX ADMIN — PIPERACILLIN AND TAZOBACTAM 200 GRAM(S): 4; .5 INJECTION, POWDER, LYOPHILIZED, FOR SOLUTION INTRAVENOUS at 04:07

## 2018-06-01 RX ADMIN — PIPERACILLIN AND TAZOBACTAM 200 GRAM(S): 4; .5 INJECTION, POWDER, LYOPHILIZED, FOR SOLUTION INTRAVENOUS at 21:10

## 2018-06-01 RX ADMIN — PIPERACILLIN AND TAZOBACTAM 200 GRAM(S): 4; .5 INJECTION, POWDER, LYOPHILIZED, FOR SOLUTION INTRAVENOUS at 16:49

## 2018-06-01 RX ADMIN — Medication 1 CAPSULE(S): at 17:06

## 2018-06-01 RX ADMIN — Medication 1 CAPSULE(S): at 12:05

## 2018-06-01 RX ADMIN — OXYCODONE AND ACETAMINOPHEN 2 TABLET(S): 5; 325 TABLET ORAL at 22:10

## 2018-06-01 RX ADMIN — HEPARIN SODIUM 5000 UNIT(S): 5000 INJECTION INTRAVENOUS; SUBCUTANEOUS at 21:10

## 2018-06-01 NOTE — PROGRESS NOTE ADULT - PROBLEM SELECTOR PLAN 1
Pt is septic with bacteroides bacteremia and infected intraabd fluid collection/abscesses and colonic leak  Persistent fevers and leukocytosis now downtrending after 2 bedside washouts  Awaiting abx susceptibilities- continue with zosyn for now  F/u wound cx  Pt would benefit form going to OR again to address infections above  Will likely need IV abx via PICC line x 2 weeks for bacteremia and abscesses

## 2018-06-01 NOTE — PROGRESS NOTE ADULT - ASSESSMENT
71 yo M with pmhx of HIV, diastolic CHF, PVD, HTN and chronic pancreatitis with pancreatic insufficiency who presented w/ worsening sharp left back pain radiating to LUQ abdominal pain admitted for enlarging pancreatic pseudocyst and possible surgical resection, medicine consulted for preoperative clearance, now s/p exlap, distal pancreatectomy and splenectomy.

## 2018-06-01 NOTE — CONSULT NOTE ADULT - PROBLEM SELECTOR RECOMMENDATION 3
4) Please give Adacel, Meningococcal, Pneumococcal and  Haemophilus B vaccine ASAP since patient s/p splenectomy.

## 2018-06-01 NOTE — PROGRESS NOTE ADULT - SUBJECTIVE AND OBJECTIVE BOX
Pt seen and examined at bedside.  Pt feeling better overall, eating a kosher meal at this time, pain controlled, in good spirits overall.        INTERVAL HPI/OVERNIGHT EVENTS:    Review of Systems: 12 point review of systems otherwise negative    MEDICATIONS  (STANDING):  albumin human  5% IVPB 1000 milliLiter(s) IV Intermittent once  amylase/lipase/protease  (CREON 36,000 Units) 1 Capsule(s) Oral three times a day with meals  docusate sodium 100 milliGRAM(s) Oral two times a day  dolutegravir 50 milliGRAM(s) Oral daily  emtricitabine 200 mG/rilpivirine 25 mG/tenofovir 300 mG 1 Tablet(s) Oral with dinner  heparin  Injectable 5000 Unit(s) SubCutaneous every 8 hours  piperacillin/tazobactam IVPB. 3.375 Gram(s) IV Intermittent every 6 hours  sodium chloride 0.9% with potassium chloride 20 mEq/L 1000 milliLiter(s) (100 mL/Hr) IV Continuous <Continuous>    MEDICATIONS  (PRN):  HYDROmorphone  Injectable 0.5 milliGRAM(s) IV Push every 4 hours PRN Moderate Pain (4 - 6)  ondansetron Injectable 4 milliGRAM(s) IV Push every 6 hours PRN Nausea  oxyCODONE    5 mG/acetaminophen 325 mG 1 Tablet(s) Oral every 4 hours PRN Moderate Pain (4 - 6)  oxyCODONE    5 mG/acetaminophen 325 mG 2 Tablet(s) Oral every 4 hours PRN Severe Pain (7 - 10)      Allergies    No Known Allergies    Intolerances        Vital Signs Last 24 Hrs  T(C): 37.1 (01 Jun 2018 08:27), Max: 37.9 (31 May 2018 20:57)  T(F): 98.7 (01 Jun 2018 08:27), Max: 100.3 (31 May 2018 20:57)  HR: 78 (01 Jun 2018 08:27) (71 - 85)  BP: 137/67 (01 Jun 2018 08:27) (110/58 - 150/70)  BP(mean): --  RR: 16 (01 Jun 2018 08:27) (16 - 17)  SpO2: 97% (01 Jun 2018 08:27) (95% - 98%)  CAPILLARY BLOOD GLUCOSE          05-31 @ 07:01  -  06-01 @ 07:00  --------------------------------------------------------  IN: 3430 mL / OUT: 1606.5 mL / NET: 1823.5 mL    06-01 @ 07:01  -  06-01 @ 12:22  --------------------------------------------------------  IN: 840 mL / OUT: 0 mL / NET: 840 mL        LABS:                        7.6    10.4  )-----------( 667      ( 01 Jun 2018 07:17 )             23.6     06-01    138  |  99  |  6<L>  ----------------------------<  116<H>  3.8   |  22  |  0.94    Ca    8.3<L>      01 Jun 2018 07:16  Phos  2.4     06-01  Mg     2.0     06-01                RADIOLOGY & ADDITIONAL TESTS:    ---------------------------------------------------------------------------  I personally reviewed: [  ]EKG   [  ]CXR    [  ] CT    [  ]Other  ---------------------------------------------------------------------------

## 2018-06-01 NOTE — PROGRESS NOTE ADULT - SUBJECTIVE AND OBJECTIVE BOX
O/N: VALERIA leaking greenish fluid with some pus-chief notified  5/31:  CT abd /pelvis  scan results Extraluminal oral contrast leaking from the descending colon in the left mid abdomen.3.5 cm air-fluid collection in the left paracolic gutter likely an abscess.4.5 cm fluid collection in the bed of pancreatic tail, slightly enlarged since prior study. Wound cx sent , two staples removed , Pt Afebrile , two staples wound cx O/N: VALERIA leaking greenish fluid with some pus-chief notified  5/31:  CT abd /pelvis  scan results Extraluminal oral contrast leaking from the descending colon in the left mid abdomen.3.5 cm air-fluid collection in the left paracolic gutter likely an abscess.4.5 cm fluid collection in the bed of pancreatic tail, slightly enlarged since prior study. Wound cx sent , two staples removed , Pt Afebrile , two staples wound cx        SUBJECTIVE: Patient seen and examined bedside by chief resident. Tmax 100.3 overnight. Vitals stable.    dolutegravir 50 milliGRAM(s) Oral daily  emtricitabine 200 mG/rilpivirine 25 mG/tenofovir 300 mG 1 Tablet(s) Oral with dinner  heparin  Injectable 5000 Unit(s) SubCutaneous every 8 hours  piperacillin/tazobactam IVPB. 3.375 Gram(s) IV Intermittent every 6 hours      Vital Signs Last 24 Hrs  T(C): 36.9 (01 Jun 2018 04:10), Max: 37.9 (31 May 2018 20:57)  T(F): 98.5 (01 Jun 2018 04:10), Max: 100.3 (31 May 2018 20:57)  HR: 77 (01 Jun 2018 04:10) (71 - 85)  BP: 144/75 (01 Jun 2018 04:10) (110/58 - 150/70)  BP(mean): --  RR: 16 (01 Jun 2018 04:10) (15 - 17)  SpO2: 97% (01 Jun 2018 04:10) (95% - 98%)  I&O's Detail    31 May 2018 07:01  -  01 Jun 2018 07:00  --------------------------------------------------------  IN:    Oral Fluid: 880 mL    sodium chloride 0.9% with potassium chloride 20 mEq/L: 1900 mL    Solution: 350 mL  Total IN: 3130 mL    OUT:    Bulb: 6.5 mL    Voided: 1600 mL  Total OUT: 1606.5 mL    Total NET: 1523.5 mL          General: NAD, resting comfortably in bed  C/V: NSR  Pulm: Nonlabored breathing, no respiratory distress  Abd: soft, NT/ND. Incisions are clean, dry, intact.  Extrem: WWP, no edema, SCDs in place        LABS:                        7.6    14.1  )-----------( 624      ( 31 May 2018 11:07 )             23.1     05-31    139  |  99  |  6<L>  ----------------------------<  110<H>  3.8   |  25  |  0.93    Ca    8.4      31 May 2018 11:07  Phos  2.9     05-31  Mg     2.0     05-31            RADIOLOGY & ADDITIONAL STUDIES:
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
## 2018-06-01 NOTE — CONSULT NOTE ADULT - ASSESSMENT
73 yo M with pmhx of HIV, CD4 400-600, undetectable viral load, no OI, diastolic CHF, PVD, HTN and chronic pancreatitis with pancreatic insufficiency who presented w/ worsening sharp left back pain radiating to LUQ abdominal pain admitted for enlarging pancreatic pseudocyst , now s/p exlap, distal pancreatectomy and splenectomy.

## 2018-06-02 LAB
-  AMOXICILLIN/CLAVULANIC ACID: SIGNIFICANT CHANGE UP
-  AMPICILLIN: SIGNIFICANT CHANGE UP
-  CLINDAMYCIN: SIGNIFICANT CHANGE UP
-  VANCOMYCIN: SIGNIFICANT CHANGE UP
ANION GAP SERPL CALC-SCNC: 15 MMOL/L — SIGNIFICANT CHANGE UP (ref 5–17)
BUN SERPL-MCNC: 4 MG/DL — LOW (ref 7–23)
CALCIUM SERPL-MCNC: 8.6 MG/DL — SIGNIFICANT CHANGE UP (ref 8.4–10.5)
CHLORIDE SERPL-SCNC: 98 MMOL/L — SIGNIFICANT CHANGE UP (ref 96–108)
CO2 SERPL-SCNC: 26 MMOL/L — SIGNIFICANT CHANGE UP (ref 22–31)
CREAT SERPL-MCNC: 0.94 MG/DL — SIGNIFICANT CHANGE UP (ref 0.5–1.3)
CULTURE RESULTS: SIGNIFICANT CHANGE UP
CULTURE RESULTS: SIGNIFICANT CHANGE UP
GLUCOSE SERPL-MCNC: 122 MG/DL — HIGH (ref 70–99)
HCT VFR BLD CALC: 23.2 % — LOW (ref 39–50)
HGB BLD-MCNC: 7.6 G/DL — LOW (ref 13–17)
MAGNESIUM SERPL-MCNC: 2 MG/DL — SIGNIFICANT CHANGE UP (ref 1.6–2.6)
MCHC RBC-ENTMCNC: 29.6 PG — SIGNIFICANT CHANGE UP (ref 27–34)
MCHC RBC-ENTMCNC: 32.8 G/DL — SIGNIFICANT CHANGE UP (ref 32–36)
MCV RBC AUTO: 90.3 FL — SIGNIFICANT CHANGE UP (ref 80–100)
METHOD TYPE: SIGNIFICANT CHANGE UP
METHOD TYPE: SIGNIFICANT CHANGE UP
ORGANISM # SPEC MICROSCOPIC CNT: SIGNIFICANT CHANGE UP
ORGANISM # SPEC MICROSCOPIC CNT: SIGNIFICANT CHANGE UP
PHOSPHATE SERPL-MCNC: 2.8 MG/DL — SIGNIFICANT CHANGE UP (ref 2.5–4.5)
PLATELET # BLD AUTO: 730 K/UL — HIGH (ref 150–400)
POTASSIUM SERPL-MCNC: 3.8 MMOL/L — SIGNIFICANT CHANGE UP (ref 3.5–5.3)
POTASSIUM SERPL-SCNC: 3.8 MMOL/L — SIGNIFICANT CHANGE UP (ref 3.5–5.3)
RBC # BLD: 2.57 M/UL — LOW (ref 4.2–5.8)
RBC # FLD: 15.3 % — SIGNIFICANT CHANGE UP (ref 10.3–16.9)
SODIUM SERPL-SCNC: 139 MMOL/L — SIGNIFICANT CHANGE UP (ref 135–145)
SPECIMEN SOURCE: SIGNIFICANT CHANGE UP
SPECIMEN SOURCE: SIGNIFICANT CHANGE UP
WBC # BLD: 10.2 K/UL — SIGNIFICANT CHANGE UP (ref 3.8–10.5)
WBC # FLD AUTO: 10.2 K/UL — SIGNIFICANT CHANGE UP (ref 3.8–10.5)

## 2018-06-02 RX ORDER — OXYCODONE AND ACETAMINOPHEN 5; 325 MG/1; MG/1
1 TABLET ORAL EVERY 4 HOURS
Qty: 0 | Refills: 0 | Status: DISCONTINUED | OUTPATIENT
Start: 2018-06-02 | End: 2018-06-05

## 2018-06-02 RX ORDER — OXYCODONE AND ACETAMINOPHEN 5; 325 MG/1; MG/1
2 TABLET ORAL EVERY 4 HOURS
Qty: 0 | Refills: 0 | Status: DISCONTINUED | OUTPATIENT
Start: 2018-06-02 | End: 2018-06-05

## 2018-06-02 RX ORDER — POTASSIUM CHLORIDE 20 MEQ
20 PACKET (EA) ORAL ONCE
Qty: 0 | Refills: 0 | Status: COMPLETED | OUTPATIENT
Start: 2018-06-02 | End: 2018-06-02

## 2018-06-02 RX ADMIN — Medication 1 CAPSULE(S): at 13:11

## 2018-06-02 RX ADMIN — PIPERACILLIN AND TAZOBACTAM 200 GRAM(S): 4; .5 INJECTION, POWDER, LYOPHILIZED, FOR SOLUTION INTRAVENOUS at 18:03

## 2018-06-02 RX ADMIN — OXYCODONE AND ACETAMINOPHEN 2 TABLET(S): 5; 325 TABLET ORAL at 10:41

## 2018-06-02 RX ADMIN — OXYCODONE AND ACETAMINOPHEN 2 TABLET(S): 5; 325 TABLET ORAL at 07:10

## 2018-06-02 RX ADMIN — Medication 1 CAPSULE(S): at 18:02

## 2018-06-02 RX ADMIN — OXYCODONE AND ACETAMINOPHEN 2 TABLET(S): 5; 325 TABLET ORAL at 11:20

## 2018-06-02 RX ADMIN — PIPERACILLIN AND TAZOBACTAM 200 GRAM(S): 4; .5 INJECTION, POWDER, LYOPHILIZED, FOR SOLUTION INTRAVENOUS at 10:41

## 2018-06-02 RX ADMIN — PIPERACILLIN AND TAZOBACTAM 200 GRAM(S): 4; .5 INJECTION, POWDER, LYOPHILIZED, FOR SOLUTION INTRAVENOUS at 23:50

## 2018-06-02 RX ADMIN — OXYCODONE AND ACETAMINOPHEN 2 TABLET(S): 5; 325 TABLET ORAL at 23:05

## 2018-06-02 RX ADMIN — Medication 20 MILLIEQUIVALENT(S): at 09:48

## 2018-06-02 RX ADMIN — HEPARIN SODIUM 5000 UNIT(S): 5000 INJECTION INTRAVENOUS; SUBCUTANEOUS at 13:11

## 2018-06-02 RX ADMIN — Medication 1 CAPSULE(S): at 09:48

## 2018-06-02 RX ADMIN — HEPARIN SODIUM 5000 UNIT(S): 5000 INJECTION INTRAVENOUS; SUBCUTANEOUS at 05:44

## 2018-06-02 RX ADMIN — DEXTROSE MONOHYDRATE, SODIUM CHLORIDE, AND POTASSIUM CHLORIDE 100 MILLILITER(S): 50; .745; 4.5 INJECTION, SOLUTION INTRAVENOUS at 19:55

## 2018-06-02 RX ADMIN — DEXTROSE MONOHYDRATE, SODIUM CHLORIDE, AND POTASSIUM CHLORIDE 100 MILLILITER(S): 50; .745; 4.5 INJECTION, SOLUTION INTRAVENOUS at 04:14

## 2018-06-02 RX ADMIN — HEPARIN SODIUM 5000 UNIT(S): 5000 INJECTION INTRAVENOUS; SUBCUTANEOUS at 21:49

## 2018-06-02 RX ADMIN — DOLUTEGRAVIR SODIUM 50 MILLIGRAM(S): 25 TABLET, FILM COATED ORAL at 13:11

## 2018-06-02 RX ADMIN — OXYCODONE AND ACETAMINOPHEN 2 TABLET(S): 5; 325 TABLET ORAL at 06:10

## 2018-06-02 RX ADMIN — Medication 100 MILLIGRAM(S): at 18:02

## 2018-06-02 RX ADMIN — OXYCODONE AND ACETAMINOPHEN 2 TABLET(S): 5; 325 TABLET ORAL at 22:09

## 2018-06-02 RX ADMIN — EMTRICITABINE, RILPIVIRINE HYDROCHLORIDE, AND TENOFOVIR DISOPROXIL FUMARATE 1 TABLET(S): 200; 25; 300 TABLET, FILM COATED ORAL at 18:03

## 2018-06-02 RX ADMIN — PIPERACILLIN AND TAZOBACTAM 200 GRAM(S): 4; .5 INJECTION, POWDER, LYOPHILIZED, FOR SOLUTION INTRAVENOUS at 04:11

## 2018-06-02 NOTE — PROGRESS NOTE ADULT - PROBLEM SELECTOR PLAN 6
s/p splenectomy, vaccines administered as per ID: meningococcal, Tdap, pneumococcal, haemophilus B    Medicine will continue to follow vaccines administered as per ID: meningococcal, Tdap, pneumococcal, haemophilus B    Medicine will continue to follow

## 2018-06-02 NOTE — PROGRESS NOTE ADULT - PROBLEM SELECTOR PLAN 1
1) Continue Zosyn 3.375 IV q6h, improving leukocytosis; follow final abdominal fluid culture  2) CT Abdomen 2 small collection, possible abscess;  needs repeated CT prior to discharge.

## 2018-06-02 NOTE — PROGRESS NOTE ADULT - SUBJECTIVE AND OBJECTIVE BOX
O/N: ARTHUR  6/1: WBC decreased to 10.4 from 14.1. P/T reevaluation re-recommends BRANDON. Post-splenectomy vaccines ordered. O/N: ARTHUR  6/1: WBC decreased to 10.4 from 14.1. P/T reevaluation re-recommends BRANDON. Post-splenectomy vaccines ordered.    SUBJECTIVE: Patient seen and examined bedside by chief resident.    dolutegravir 50 milliGRAM(s) Oral daily  emtricitabine 200 mG/rilpivirine 25 mG/tenofovir 300 mG 1 Tablet(s) Oral with dinner  heparin  Injectable 5000 Unit(s) SubCutaneous every 8 hours  piperacillin/tazobactam IVPB. 3.375 Gram(s) IV Intermittent every 6 hours      Vital Signs Last 24 Hrs  T(C): 37.6 (02 Jun 2018 05:05), Max: 37.6 (02 Jun 2018 05:05)  T(F): 99.6 (02 Jun 2018 05:05), Max: 99.6 (02 Jun 2018 05:05)  HR: 78 (02 Jun 2018 05:05) (76 - 94)  BP: 146/82 (02 Jun 2018 05:05) (133/73 - 169/80)  BP(mean): --  RR: 17 (02 Jun 2018 05:05) (15 - 17)  SpO2: 98% (02 Jun 2018 05:05) (95% - 98%)  I&O's Detail    01 Jun 2018 07:01  -  02 Jun 2018 07:00  --------------------------------------------------------  IN:    Oral Fluid: 1460 mL    sodium chloride 0.9% with potassium chloride 20 mEq/L: 2050 mL    Solution: 100 mL  Total IN: 3610 mL    OUT:    Bulb: 32.5 mL    Voided: 1950 mL  Total OUT: 1982.5 mL    Total NET: 1627.5 mL          General: NAD, resting comfortably in bed  Pulm: Nonlabored breathing, no respiratory distress  Abd: soft, NT/ND, wounds clean and dry, VALERIA green purulent output        LABS:                        7.6    10.2  )-----------( 730      ( 02 Jun 2018 07:40 )             23.2     06-01    138  |  99  |  6<L>  ----------------------------<  116<H>  3.8   |  22  |  0.94    Ca    8.3<L>      01 Jun 2018 07:16  Phos  2.4     06-01  Mg     2.0     06-01            RADIOLOGY & ADDITIONAL STUDIES:

## 2018-06-02 NOTE — PROGRESS NOTE ADULT - PROBLEM SELECTOR PLAN 2
S/p drainage, VALERIA drain intact draining brown fluid.  Management per primary team.  For his pain, continue Percocet Q4H PRN for moderate pain, 2x percocet Q4H PRN for severe pain, and 0.5mg IV Dilaudid for breakthrough. Would assess for somnolence prior to administer medication.  -Contine with Creon

## 2018-06-02 NOTE — PROGRESS NOTE ADULT - PROBLEM SELECTOR PLAN 1
Pt is septic with bacteroides bacteremia and infected intraabdominal fluid collection/abscesses, now afebrile for 24 hours w/downtrended leukocytosis after multiple bedside washouts  Awaiting antibiotic susceptibilities and wound culture data - continue with zosyn for now, pt will likely need PICC line placement for 2 weeks of IV antibiotics for bacteremia and abscesses - plan as per ID  -Pt would benefit from going to OR again to address infections above Pt is septic with bacteroides bacteremia and infected intraabdominal fluid collection/abscesses, now afebrile for 24 hours w/ improved leukocytosis.   Awaiting antibiotic susceptibilities and wound culture data - continue with zosyn for now, pt will likely need PICC line placement for 2 weeks of IV antibiotics for bacteremia and abscesses - plan as per ID

## 2018-06-02 NOTE — PROGRESS NOTE ADULT - SUBJECTIVE AND OBJECTIVE BOX
INTERVAL HPI/OVERNIGHT EVENTS: Low grade fever 100.2F, improving    CONSTITUTIONAL:  Negative fever or chills, feels well, good appetite  EYES:  Negative  blurry vision or double vision  CARDIOVASCULAR:  Negative for chest pain or palpitations  RESPIRATORY:  Negative for cough, wheezing, or SOB   GASTROINTESTINAL:  Negative for nausea, vomiting, diarrhea, constipation, or abdominal pain  GENITOURINARY:  Negative frequency, urgency or dysuria  NEUROLOGIC:  No headache, confusion, dizziness, lightheadedness      ANTIBIOTICS/RELEVANT:    MEDICATIONS  (STANDING):  albumin human  5% IVPB 1000 milliLiter(s) IV Intermittent once  amylase/lipase/protease  (CREON 36,000 Units) 1 Capsule(s) Oral three times a day with meals  docusate sodium 100 milliGRAM(s) Oral two times a day  dolutegravir 50 milliGRAM(s) Oral daily  emtricitabine 200 mG/rilpivirine 25 mG/tenofovir 300 mG 1 Tablet(s) Oral with dinner  heparin  Injectable 5000 Unit(s) SubCutaneous every 8 hours  piperacillin/tazobactam IVPB. 3.375 Gram(s) IV Intermittent every 6 hours  sodium chloride 0.9% with potassium chloride 20 mEq/L 1000 milliLiter(s) (100 mL/Hr) IV Continuous <Continuous>    MEDICATIONS  (PRN):  HYDROmorphone  Injectable 0.5 milliGRAM(s) IV Push every 4 hours PRN Moderate Pain (4 - 6)  ondansetron Injectable 4 milliGRAM(s) IV Push every 6 hours PRN Nausea  oxyCODONE    5 mG/acetaminophen 325 mG 1 Tablet(s) Oral every 4 hours PRN Moderate Pain (4 - 6)  oxyCODONE    5 mG/acetaminophen 325 mG 2 Tablet(s) Oral every 4 hours PRN Severe Pain (7 - 10)        Vital Signs Last 24 Hrs  T(C): 37.9 (02 Jun 2018 21:16), Max: 37.9 (02 Jun 2018 21:16)  T(F): 100.2 (02 Jun 2018 21:16), Max: 100.2 (02 Jun 2018 21:16)  HR: 84 (02 Jun 2018 21:16) (71 - 84)  BP: 158/73 (02 Jun 2018 21:16) (124/71 - 158/73)  BP(mean): --  RR: 17 (02 Jun 2018 21:16) (16 - 17)  SpO2: 95% (02 Jun 2018 21:16) (95% - 98%)    06-01-18 @ 07:01  -  06-02-18 @ 07:00  --------------------------------------------------------  IN: 3610 mL / OUT: 1982.5 mL / NET: 1627.5 mL    06-02-18 @ 07:01  -  06-02-18 @ 22:03  --------------------------------------------------------  IN: 1250 mL / OUT: 1000 mL / NET: 250 mL      PHYSICAL EXAM:  Constitutional: Well-developed, well nourished  Eyes:JAY, EOMI  Ear/Nose/Throat: no oral lesion, no sinus tenderness on percussion	  Neck:no JVD, no lymphadenopathy, supple  Respiratory: CTA ayo  Cardiovascular: S1S2 RRR, no murmurs  Gastrointestinal: soft, (+) BS,Abdominal incision with staples, in LLQ packed with gauze, VALERIA in place   Extremities:no e/e/c  Vascular: DP Pulse:	right normal; left normal      LABS:                        7.6    10.2  )-----------( 730      ( 02 Jun 2018 07:40 )             23.2     06-02    139  |  98  |  4<L>  ----------------------------<  122<H>  3.8   |  26  |  0.94    Ca    8.6      02 Jun 2018 07:40  Phos  2.8     06-02  Mg     2.0     06-02            MICROBIOLOGY:    RADIOLOGY & ADDITIONAL STUDIES:

## 2018-06-02 NOTE — PROGRESS NOTE ADULT - PROBLEM SELECTOR PLAN 3
pt remains normotensive off of BP medications, given active infection would not be aggressive with BP control.  Continue to monitor and once stable would plan to restart home HTZ

## 2018-06-02 NOTE — PROGRESS NOTE ADULT - SUBJECTIVE AND OBJECTIVE BOX
This note is in progress     OVERNIGHT EVENTS:    SUBJECTIVE / INTERVAL HPI: Patient seen and examined at bedside.     VITAL SIGNS:  Vital Signs Last 24 Hrs  T(C): 37.6 (02 Jun 2018 05:05), Max: 37.6 (02 Jun 2018 05:05)  T(F): 99.6 (02 Jun 2018 05:05), Max: 99.6 (02 Jun 2018 05:05)  HR: 78 (02 Jun 2018 05:05) (76 - 94)  BP: 146/82 (02 Jun 2018 05:05) (133/73 - 169/80)  BP(mean): --  RR: 17 (02 Jun 2018 05:05) (15 - 17)  SpO2: 98% (02 Jun 2018 05:05) (95% - 98%)    PHYSICAL EXAM:    General: WDWN  HEENT: NC/AT; PERRL, clear conjunctiva  Neck: supple  Cardiovascular: +S1/S2; RRR  Respiratory: CTA b/l; no W/R/R  Gastrointestinal: soft, NT/ND; +BSx4  Extremities: WWP; 2+ peripheral pulses; no edema   Neurological: AAOx3; no focal deficits    MEDICATIONS:  MEDICATIONS  (STANDING):  albumin human  5% IVPB 1000 milliLiter(s) IV Intermittent once  amylase/lipase/protease  (CREON 36,000 Units) 1 Capsule(s) Oral three times a day with meals  docusate sodium 100 milliGRAM(s) Oral two times a day  dolutegravir 50 milliGRAM(s) Oral daily  emtricitabine 200 mG/rilpivirine 25 mG/tenofovir 300 mG 1 Tablet(s) Oral with dinner  heparin  Injectable 5000 Unit(s) SubCutaneous every 8 hours  piperacillin/tazobactam IVPB. 3.375 Gram(s) IV Intermittent every 6 hours  sodium chloride 0.9% with potassium chloride 20 mEq/L 1000 milliLiter(s) (100 mL/Hr) IV Continuous <Continuous>    MEDICATIONS  (PRN):  HYDROmorphone  Injectable 0.5 milliGRAM(s) IV Push every 4 hours PRN Moderate Pain (4 - 6)  ondansetron Injectable 4 milliGRAM(s) IV Push every 6 hours PRN Nausea  oxyCODONE    5 mG/acetaminophen 325 mG 1 Tablet(s) Oral every 4 hours PRN Moderate Pain (4 - 6)  oxyCODONE    5 mG/acetaminophen 325 mG 2 Tablet(s) Oral every 4 hours PRN Severe Pain (7 - 10)      ALLERGIES:  Allergies    No Known Allergies    Intolerances        LABS:                        7.6    10.4  )-----------( 667      ( 01 Jun 2018 07:17 )             23.6     06-01    138  |  99  |  6<L>  ----------------------------<  116<H>  3.8   |  22  |  0.94    Ca    8.3<L>      01 Jun 2018 07:16  Phos  2.4     06-01  Mg     2.0     06-01          CAPILLARY BLOOD GLUCOSE          RADIOLOGY & ADDITIONAL TESTS: Reviewed.    ASSESSMENT:    PLAN: OVERNIGHT EVENTS: ARTHUR    SUBJECTIVE / INTERVAL HPI: Patient seen and examined at bedside.     VITAL SIGNS:  Vital Signs Last 24 Hrs  T(C): 37.6 (02 Jun 2018 05:05), Max: 37.6 (02 Jun 2018 05:05)  T(F): 99.6 (02 Jun 2018 05:05), Max: 99.6 (02 Jun 2018 05:05)  HR: 78 (02 Jun 2018 05:05) (76 - 94)  BP: 146/82 (02 Jun 2018 05:05) (133/73 - 169/80)  BP(mean): --  RR: 17 (02 Jun 2018 05:05) (15 - 17)  SpO2: 98% (02 Jun 2018 05:05) (95% - 98%)    PHYSICAL EXAM:    General: WDWN  HEENT: NC/AT; PERRL, clear conjunctiva  Neck: supple  Cardiovascular: +S1/S2; RRR  Respiratory: CTA b/l; no W/R/R  Gastrointestinal: soft, NT/ND; +BSx4  Extremities: WWP; 2+ peripheral pulses; no edema   Neurological: AAOx3; no focal deficits    MEDICATIONS:  MEDICATIONS  (STANDING):  albumin human  5% IVPB 1000 milliLiter(s) IV Intermittent once  amylase/lipase/protease  (CREON 36,000 Units) 1 Capsule(s) Oral three times a day with meals  docusate sodium 100 milliGRAM(s) Oral two times a day  dolutegravir 50 milliGRAM(s) Oral daily  emtricitabine 200 mG/rilpivirine 25 mG/tenofovir 300 mG 1 Tablet(s) Oral with dinner  heparin  Injectable 5000 Unit(s) SubCutaneous every 8 hours  piperacillin/tazobactam IVPB. 3.375 Gram(s) IV Intermittent every 6 hours  sodium chloride 0.9% with potassium chloride 20 mEq/L 1000 milliLiter(s) (100 mL/Hr) IV Continuous <Continuous>    MEDICATIONS  (PRN):  HYDROmorphone  Injectable 0.5 milliGRAM(s) IV Push every 4 hours PRN Moderate Pain (4 - 6)  ondansetron Injectable 4 milliGRAM(s) IV Push every 6 hours PRN Nausea  oxyCODONE    5 mG/acetaminophen 325 mG 1 Tablet(s) Oral every 4 hours PRN Moderate Pain (4 - 6)  oxyCODONE    5 mG/acetaminophen 325 mG 2 Tablet(s) Oral every 4 hours PRN Severe Pain (7 - 10)      ALLERGIES:  Allergies    No Known Allergies    Intolerances        LABS:                        7.6    10.4  )-----------( 667      ( 01 Jun 2018 07:17 )             23.6     06-01    138  |  99  |  6<L>  ----------------------------<  116<H>  3.8   |  22  |  0.94    Ca    8.3<L>      01 Jun 2018 07:16  Phos  2.4     06-01  Mg     2.0     06-01          CAPILLARY BLOOD GLUCOSE          RADIOLOGY & ADDITIONAL TESTS: Reviewed.    ASSESSMENT:    PLAN: OVERNIGHT EVENTS: ARTHUR    SUBJECTIVE / INTERVAL HPI: Patient seen and examined at bedside. Pt reports feeling better, states that "the green drainage on the gauze has now turned to red so I'm thinking it's now blood instead of an infection which is better"    VITAL SIGNS:  Vital Signs Last 24 Hrs  T(C): 37.6 (02 Jun 2018 05:05), Max: 37.6 (02 Jun 2018 05:05)  T(F): 99.6 (02 Jun 2018 05:05), Max: 99.6 (02 Jun 2018 05:05)  HR: 78 (02 Jun 2018 05:05) (76 - 94)  BP: 146/82 (02 Jun 2018 05:05) (133/73 - 169/80)  BP(mean): --  RR: 17 (02 Jun 2018 05:05) (15 - 17)  SpO2: 98% (02 Jun 2018 05:05) (95% - 98%)    PHYSICAL EXAM:    General: WDWN, resting in bed eating breakfast  HEENT: NC/AT; PERRL, clear conjunctiva  Neck: supple  Cardiovascular: +S1/S2; RRR  Respiratory: CTA b/l; no W/R/R  Gastrointestinal: soft, +BSx4, Left sided horizontal surgical wound staples in place, L lateral aspect of the wound open w/gauze in place, surgical drain w/5cc of serosanguinous drainage, LLQ and LUQ tenderness to deep palpation  Extremities: WWP; 2+ peripheral pulses; no edema   Neurological: AAOx3; no focal deficits    MEDICATIONS:  MEDICATIONS  (STANDING):  albumin human  5% IVPB 1000 milliLiter(s) IV Intermittent once  amylase/lipase/protease  (CREON 36,000 Units) 1 Capsule(s) Oral three times a day with meals  docusate sodium 100 milliGRAM(s) Oral two times a day  dolutegravir 50 milliGRAM(s) Oral daily  emtricitabine 200 mG/rilpivirine 25 mG/tenofovir 300 mG 1 Tablet(s) Oral with dinner  heparin  Injectable 5000 Unit(s) SubCutaneous every 8 hours  piperacillin/tazobactam IVPB. 3.375 Gram(s) IV Intermittent every 6 hours  sodium chloride 0.9% with potassium chloride 20 mEq/L 1000 milliLiter(s) (100 mL/Hr) IV Continuous <Continuous>    MEDICATIONS  (PRN):  HYDROmorphone  Injectable 0.5 milliGRAM(s) IV Push every 4 hours PRN Moderate Pain (4 - 6)  ondansetron Injectable 4 milliGRAM(s) IV Push every 6 hours PRN Nausea  oxyCODONE    5 mG/acetaminophen 325 mG 1 Tablet(s) Oral every 4 hours PRN Moderate Pain (4 - 6)  oxyCODONE    5 mG/acetaminophen 325 mG 2 Tablet(s) Oral every 4 hours PRN Severe Pain (7 - 10)      ALLERGIES:  Allergies    No Known Allergies    Intolerances        LABS:                        7.6    10.4  )-----------( 667      ( 01 Jun 2018 07:17 )             23.6     06-01    138  |  99  |  6<L>  ----------------------------<  116<H>  3.8   |  22  |  0.94    Ca    8.3<L>      01 Jun 2018 07:16  Phos  2.4     06-01  Mg     2.0     06-01          CAPILLARY BLOOD GLUCOSE          RADIOLOGY & ADDITIONAL TESTS: Reviewed.    ASSESSMENT:    PLAN:

## 2018-06-03 DIAGNOSIS — R18.8 OTHER ASCITES: ICD-10-CM

## 2018-06-03 DIAGNOSIS — B96.6 BACTEROIDES FRAGILIS [B. FRAGILIS] AS THE CAUSE OF DISEASES CLASSIFIED ELSEWHERE: ICD-10-CM

## 2018-06-03 LAB
CULTURE RESULTS: SIGNIFICANT CHANGE UP
ORGANISM # SPEC MICROSCOPIC CNT: SIGNIFICANT CHANGE UP
SPECIMEN SOURCE: SIGNIFICANT CHANGE UP

## 2018-06-03 RX ADMIN — DOLUTEGRAVIR SODIUM 50 MILLIGRAM(S): 25 TABLET, FILM COATED ORAL at 13:03

## 2018-06-03 RX ADMIN — PIPERACILLIN AND TAZOBACTAM 200 GRAM(S): 4; .5 INJECTION, POWDER, LYOPHILIZED, FOR SOLUTION INTRAVENOUS at 11:20

## 2018-06-03 RX ADMIN — HEPARIN SODIUM 5000 UNIT(S): 5000 INJECTION INTRAVENOUS; SUBCUTANEOUS at 13:02

## 2018-06-03 RX ADMIN — HEPARIN SODIUM 5000 UNIT(S): 5000 INJECTION INTRAVENOUS; SUBCUTANEOUS at 21:12

## 2018-06-03 RX ADMIN — EMTRICITABINE, RILPIVIRINE HYDROCHLORIDE, AND TENOFOVIR DISOPROXIL FUMARATE 1 TABLET(S): 200; 25; 300 TABLET, FILM COATED ORAL at 17:42

## 2018-06-03 RX ADMIN — OXYCODONE AND ACETAMINOPHEN 2 TABLET(S): 5; 325 TABLET ORAL at 10:10

## 2018-06-03 RX ADMIN — DEXTROSE MONOHYDRATE, SODIUM CHLORIDE, AND POTASSIUM CHLORIDE 100 MILLILITER(S): 50; .745; 4.5 INJECTION, SOLUTION INTRAVENOUS at 07:16

## 2018-06-03 RX ADMIN — OXYCODONE AND ACETAMINOPHEN 2 TABLET(S): 5; 325 TABLET ORAL at 21:12

## 2018-06-03 RX ADMIN — Medication 1 CAPSULE(S): at 17:42

## 2018-06-03 RX ADMIN — PIPERACILLIN AND TAZOBACTAM 200 GRAM(S): 4; .5 INJECTION, POWDER, LYOPHILIZED, FOR SOLUTION INTRAVENOUS at 23:04

## 2018-06-03 RX ADMIN — Medication 1 CAPSULE(S): at 08:03

## 2018-06-03 RX ADMIN — Medication 1 CAPSULE(S): at 13:02

## 2018-06-03 RX ADMIN — OXYCODONE AND ACETAMINOPHEN 2 TABLET(S): 5; 325 TABLET ORAL at 09:09

## 2018-06-03 RX ADMIN — OXYCODONE AND ACETAMINOPHEN 2 TABLET(S): 5; 325 TABLET ORAL at 22:10

## 2018-06-03 RX ADMIN — OXYCODONE AND ACETAMINOPHEN 2 TABLET(S): 5; 325 TABLET ORAL at 13:03

## 2018-06-03 RX ADMIN — HEPARIN SODIUM 5000 UNIT(S): 5000 INJECTION INTRAVENOUS; SUBCUTANEOUS at 05:10

## 2018-06-03 RX ADMIN — PIPERACILLIN AND TAZOBACTAM 200 GRAM(S): 4; .5 INJECTION, POWDER, LYOPHILIZED, FOR SOLUTION INTRAVENOUS at 17:42

## 2018-06-03 RX ADMIN — OXYCODONE AND ACETAMINOPHEN 2 TABLET(S): 5; 325 TABLET ORAL at 14:00

## 2018-06-03 RX ADMIN — Medication 100 MILLIGRAM(S): at 05:10

## 2018-06-03 RX ADMIN — PIPERACILLIN AND TAZOBACTAM 200 GRAM(S): 4; .5 INJECTION, POWDER, LYOPHILIZED, FOR SOLUTION INTRAVENOUS at 05:10

## 2018-06-03 NOTE — PROGRESS NOTE ADULT - SUBJECTIVE AND OBJECTIVE BOX
INTERVAL HPI/OVERNIGHT EVENTS: Afebrile , improving pain    CONSTITUTIONAL:  Negative fever or chills, feels well, good appetite  EYES:  Negative  blurry vision or double vision  CARDIOVASCULAR:  Negative for chest pain or palpitations  RESPIRATORY:  Negative for cough, wheezing, or SOB   GASTROINTESTINAL:  Negative for nausea, vomiting, diarrhea, constipation, or abdominal pain  GENITOURINARY:  Negative frequency, urgency or dysuria  NEUROLOGIC:  No headache, confusion, dizziness, lightheadedness      ANTIBIOTICS/RELEVANT:    MEDICATIONS  (STANDING):  albumin human  5% IVPB 1000 milliLiter(s) IV Intermittent once  amylase/lipase/protease  (CREON 36,000 Units) 1 Capsule(s) Oral three times a day with meals  docusate sodium 100 milliGRAM(s) Oral two times a day  dolutegravir 50 milliGRAM(s) Oral daily  emtricitabine 200 mG/rilpivirine 25 mG/tenofovir 300 mG 1 Tablet(s) Oral with dinner  heparin  Injectable 5000 Unit(s) SubCutaneous every 8 hours  piperacillin/tazobactam IVPB. 3.375 Gram(s) IV Intermittent every 6 hours  sodium chloride 0.9% with potassium chloride 20 mEq/L 1000 milliLiter(s) (100 mL/Hr) IV Continuous <Continuous>    MEDICATIONS  (PRN):  HYDROmorphone  Injectable 0.5 milliGRAM(s) IV Push every 4 hours PRN Moderate Pain (4 - 6)  ondansetron Injectable 4 milliGRAM(s) IV Push every 6 hours PRN Nausea  oxyCODONE    5 mG/acetaminophen 325 mG 1 Tablet(s) Oral every 4 hours PRN Moderate Pain (4 - 6)  oxyCODONE    5 mG/acetaminophen 325 mG 2 Tablet(s) Oral every 4 hours PRN Severe Pain (7 - 10)        Vital Signs Last 24 Hrs  T(C): 37.2 (03 Jun 2018 09:02), Max: 37.9 (02 Jun 2018 21:16)  T(F): 98.9 (03 Jun 2018 09:02), Max: 100.2 (02 Jun 2018 21:16)  HR: 88 (03 Jun 2018 09:02) (71 - 88)  BP: 137/68 (03 Jun 2018 09:02) (120/59 - 158/73)  BP(mean): --  RR: 17 (03 Jun 2018 09:02) (17 - 17)  SpO2: 97% (03 Jun 2018 09:02) (95% - 97%)    06-02-18 @ 07:01  -  06-03-18 @ 07:00  --------------------------------------------------------  IN: 2550 mL / OUT: 1560 mL / NET: 990 mL    06-03-18 @ 07:01  -  06-03-18 @ 15:51  --------------------------------------------------------  IN: 750 mL / OUT: 800 mL / NET: -50 mL      PHYSICAL EXAM:  Constitutional:Well-developed, well nourished  Eyes:JAY, EOMI  Ear/Nose/Throat: no oral lesion, no sinus tenderness on percussion	  Neck:no JVD, no lymphadenopathy, supple  Respiratory: CTA ayo  Cardiovascular: S1S2 RRR, no murmurs  Gastrointestinal:soft, (+) BS,Abdominal incision with staples, in LLQ packed with gauze,   VALERIA in place draining brownish fluid  Extremities:no e/e/c  Vascular: DP Pulse: right normal; left normal      LABS:                        7.6    10.2  )-----------( 730      ( 02 Jun 2018 07:40 )             23.2     06-02    139  |  98  |  4<L>  ----------------------------<  122<H>  3.8   |  26  |  0.94    Ca    8.6      02 Jun 2018 07:40  Phos  2.8     06-02  Mg     2.0     06-02            MICROBIOLOGY:  Culture - Other (05.31.18 @ 12:58)    Gram Stain:   Rare Gram positive cocci in pairs  Moderate White blood cells    -  Ampicillin: S <=2    -  Vancomycin: S 2    Specimen Source: .Other abdominal wound    Culture Results:   Few Enterococcus faecalis    Organism Identification: Enterococcus faecalis    Organism: Enterococcus faecalis    Method Type: EUGENE    Culture - Blood (05.28.18 @ 08:17)    Gram Stain:   Anaerobic Bottle: Gram Negative Rods  Result called to and read back by_ EARLENE Carballo SHANI 05/29/2018 13:32:13  ***Blood Panel PCR results on this specimen are available  approximately 3 hours after the Gram stain result.***  Gram stain, PCR, and/or culture results may not always  correspond due to difference in methodologies.  ************************************************************  This PCR assay was performed using [employer].  The following targets are tested for: Enterococcus,  vancomycin resistant enterococci, Listeria monocytogenes,  coagulase negative staphylococci, S. aureus,  methicillin resistant S. aureus, Streptococcus agalactiae  (Group B), S. pneumoniae, S. pyogenes (Group A),  Acinetobacter baumannii, Enterobacter cloacae, E. coli,  Klebsiella oxytoca, K. pneumoniae, Proteus sp.,  Serratia marcescens, Haemophilus influenzae,  Neisseria meningitidis, Pseudomonas aeruginosa, Candida  albicans, C. glabrata, C krusei, C parapsilosis,  C. tropicalis and the KPC resistance gene.  "Due to technical problems, Proteus sp. will Not be reported as part of  the BCID panel until further notice"    -  Clindamycin: R 16    -  Amoxicillin/Clavulanic Acid: I 8    -  Multidrug (KPC pos) resistant organism: Nondet    -  Staphylococcus aureus: Nondet    -  Methicillin resistant Staphylococcus aureus (MRSA): Nondet    -  Coagulase negative Staphylococcus: Nondet    -  Enterococcus species: Nondet    -  Vancomycin resistant Enterococcus sp.: Nondet    -  Escherichia coli: Nondet    -  Klebsiella oxytoca: Nondet    -  Klebsiella pneumoniae: Nondet    -  Serratia marcescens: Nondet    -  Proteus species: Nondet    -  Haemophilus influenzae: Nondet    -  Listeria monocytogenes: Nondet    -  Neisseria meningitidis: Nondet    -  Pseudomonas aeruginosa: Nondet    -  Acinetobacter baumanii: Nondet    -  Enterobacter cloacae complex: Nondet    -  Streptococcus sp. (Not Grp A, B or S pneumoniae): Nondet    -  Streptococcus agalactiae (Group B): Nondet    -  Streptococcus pyogenes (Group A): Nondet    -  Streptococcus pneumoniae: Nondet    -  Candida albicans: Nondet    -  Candida glabrata: Nondet    -  Candida krusei: Nondet    -  Candida parapsilosis: Nondet    -  Candida tropicalis: Nondet    Specimen Source: .Blood Blood    Organism: Bacteroides thetaiotaomicron    Organism: Blood Culture PCR    Culture Results:   Growth in anaerobic bottle: Bacteroides thetaiotaomicron Beta lactamase  positive    Organism Identification: Bacteroides thetaiotaomicron  Blood Culture PCR    Method Type: PCR    Method Type: ETEST        RADIOLOGY & ADDITIONAL STUDIES:

## 2018-06-03 NOTE — PROGRESS NOTE ADULT - ASSESSMENT
71 yo M with pmhx of HIV, CD4 400-600, undetectable viral load, no OI, diastolic CHF, PVD, HTN and chronic pancreatitis with pancreatic insufficiency who presented w/ worsening sharp left back pain radiating to LUQ abdominal pain admitted for enlarging pancreatic pseudocyst , now s/p exlap, distal pancreatectomy and splenectomy, abdominal collection (+) E faecalis and Bacteroides thetaiotaomicron

## 2018-06-03 NOTE — PROGRESS NOTE ADULT - SUBJECTIVE AND OBJECTIVE BOX
MEDICINE FOLLOW UP      Patient is a 72y old  Male who presents with a chief complaint of abd pain (22 May 2018 09:23)      INTERVAL HPI/OVERNIGHT EVENTS:  still w/ left sided abd pain    ROS  (- ) headache  ( -  )fevers/chills  ( - ) dyspnea  (  - ) cough  (  - ) chest pain  (  - ) palpatations  ( - ) dizziness/lightheadedness  (  - ) nausea/vomiting     (  - ) diarrhea  (  - ) melena  (  - ) hematochezia  (  - ) dysuria   ( - ) hematuria  (  - ) leg swelling    ( - ) calf tenderness  (  - ) motor weakness  ( - ) extremity numbness  ( - ) back pain  ( + ) tolerating POs  ( + ) BM  ROS: 12 point review of systems otherwise negative              MEDICATIONS  (STANDING):  albumin human  5% IVPB 1000 milliLiter(s) IV Intermittent once  amylase/lipase/protease  (CREON 36,000 Units) 1 Capsule(s) Oral three times a day with meals  docusate sodium 100 milliGRAM(s) Oral two times a day  dolutegravir 50 milliGRAM(s) Oral daily  emtricitabine 200 mG/rilpivirine 25 mG/tenofovir 300 mG 1 Tablet(s) Oral with dinner  heparin  Injectable 5000 Unit(s) SubCutaneous every 8 hours  piperacillin/tazobactam IVPB. 3.375 Gram(s) IV Intermittent every 6 hours  sodium chloride 0.9% with potassium chloride 20 mEq/L 1000 milliLiter(s) (100 mL/Hr) IV Continuous <Continuous>    MEDICATIONS  (PRN):  HYDROmorphone  Injectable 0.5 milliGRAM(s) IV Push every 4 hours PRN Moderate Pain (4 - 6)  ondansetron Injectable 4 milliGRAM(s) IV Push every 6 hours PRN Nausea  oxyCODONE    5 mG/acetaminophen 325 mG 1 Tablet(s) Oral every 4 hours PRN Moderate Pain (4 - 6)  oxyCODONE    5 mG/acetaminophen 325 mG 2 Tablet(s) Oral every 4 hours PRN Severe Pain (7 - 10)      Allergies    No Known Allergies    Intolerances          Vital Signs Last 24 Hrs  T(C): 37.2 (03 Jun 2018 09:02), Max: 37.9 (02 Jun 2018 21:16)  T(F): 98.9 (03 Jun 2018 09:02), Max: 100.2 (02 Jun 2018 21:16)  HR: 88 (03 Jun 2018 09:02) (71 - 88)  BP: 137/68 (03 Jun 2018 09:02) (120/59 - 158/73)  BP(mean): --  RR: 17 (03 Jun 2018 09:02) (17 - 17)  SpO2: 97% (03 Jun 2018 09:02) (95% - 97%)  CAPILLARY BLOOD GLUCOSE          06-02 @ 07:01  -  06-03 @ 07:00  --------------------------------------------------------  IN: 2550 mL / OUT: 1560 mL / NET: 990 mL        Physical Exam:    Daily     Daily   General:  Well appearing   HEENT:  Nonicteric, PERRLA, oral pharynx w/o erythema/exudate,  neck supple  CV:  [id number] , RRR,  no JVD  Lungs:  CTA B/L, no wheezes, rales, rhonchi  Abdomen:  positive BS, Soft, LLQ , L periumb tender, no r/g, drain in place.   Extremities:  2+ DP/radial pulses b/l, no cyanosis   Back: no cva or midline tenderness  Skin:  Warm and dry   :  No curiel  Neuro:  AAOx3,  CN II-XII grossly intact, 5/5 str all 4 ext, sensation intact,     LABS:                        7.6    10.2  )-----------( 730      ( 02 Jun 2018 07:40 )             23.2     06-02    139  |  98  |  4<L>  ----------------------------<  122<H>  3.8   |  26  |  0.94    Ca    8.6      02 Jun 2018 07:40  Phos  2.8     06-02  Mg     2.0     06-02              RADIOLOGY & ADDITIONAL TESTS:

## 2018-06-03 NOTE — PROGRESS NOTE ADULT - SUBJECTIVE AND OBJECTIVE BOX
6/2: ARTHUR, VSS 6/2: ARTHUR, VSS    SUBJECTIVE: denies any complaints       MEDICATIONS  (STANDING):  albumin human  5% IVPB 1000 milliLiter(s) IV Intermittent once  amylase/lipase/protease  (CREON 36,000 Units) 1 Capsule(s) Oral three times a day with meals  docusate sodium 100 milliGRAM(s) Oral two times a day  dolutegravir 50 milliGRAM(s) Oral daily  emtricitabine 200 mG/rilpivirine 25 mG/tenofovir 300 mG 1 Tablet(s) Oral with dinner  heparin  Injectable 5000 Unit(s) SubCutaneous every 8 hours  piperacillin/tazobactam IVPB. 3.375 Gram(s) IV Intermittent every 6 hours  sodium chloride 0.9% with potassium chloride 20 mEq/L 1000 milliLiter(s) (100 mL/Hr) IV Continuous <Continuous>    MEDICATIONS  (PRN):  HYDROmorphone  Injectable 0.5 milliGRAM(s) IV Push every 4 hours PRN Moderate Pain (4 - 6)  ondansetron Injectable 4 milliGRAM(s) IV Push every 6 hours PRN Nausea  oxyCODONE    5 mG/acetaminophen 325 mG 1 Tablet(s) Oral every 4 hours PRN Moderate Pain (4 - 6)  oxyCODONE    5 mG/acetaminophen 325 mG 2 Tablet(s) Oral every 4 hours PRN Severe Pain (7 - 10)      Vital Signs Last 24 Hrs  T(C): 37.2 (03 Jun 2018 09:02), Max: 37.9 (02 Jun 2018 21:16)  T(F): 98.9 (03 Jun 2018 09:02), Max: 100.2 (02 Jun 2018 21:16)  HR: 88 (03 Jun 2018 09:02) (71 - 88)  BP: 137/68 (03 Jun 2018 09:02) (120/59 - 158/73)  BP(mean): --  RR: 17 (03 Jun 2018 09:02) (17 - 17)  SpO2: 97% (03 Jun 2018 09:02) (95% - 97%)    PHYSICAL EXAM:      Constitutional: A&Ox3      Respiratory: non labored breathing, no respiratory distress    Cardiovascular: NSR, RRR    Gastrointestinal: soft ND,NT                 Incision:CDI    Genitourinary: voiding    Extremities: (-) edema                  I&O's Detail    02 Jun 2018 07:01  -  03 Jun 2018 07:00  --------------------------------------------------------  IN:    sodium chloride 0.9% with potassium chloride 20 mEq/L: 2250 mL    Solution: 300 mL  Total IN: 2550 mL    OUT:    Bulb: 10 mL    Voided: 1550 mL  Total OUT: 1560 mL    Total NET: 990 mL          LABS:                        7.6    10.2  )-----------( 730      ( 02 Jun 2018 07:40 )             23.2     06-02    139  |  98  |  4<L>  ----------------------------<  122<H>  3.8   |  26  |  0.94    Ca    8.6      02 Jun 2018 07:40  Phos  2.8     06-02  Mg     2.0     06-02            RADIOLOGY & ADDITIONAL STUDIES:

## 2018-06-03 NOTE — PROGRESS NOTE ADULT - PROBLEM SELECTOR PLAN 7
vaccines administered as per ID: meningococcal, Tdap, pneumococcal, haemophilus B    Medicine will continue to follow

## 2018-06-03 NOTE — PROGRESS NOTE ADULT - PROBLEM SELECTOR PROBLEM 7
Spleen absent
Hyperlipidemia, unspecified hyperlipidemia type
Pseudocyst of pancreas
Asymptomatic human immunodeficiency virus infection

## 2018-06-03 NOTE — PROGRESS NOTE ADULT - PROBLEM SELECTOR PLAN 1
1) Continue Zosyn 3.375 IV q6h, improving leukocytosis  2) CT Abdomen 2 small collection, possible abscess; surgery recommends no further drainage;  needs repeated CT prior to discharge.
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
## 2018-06-04 LAB
ANION GAP SERPL CALC-SCNC: 12 MMOL/L — SIGNIFICANT CHANGE UP (ref 5–17)
BUN SERPL-MCNC: 4 MG/DL — LOW (ref 7–23)
CALCIUM SERPL-MCNC: 8.6 MG/DL — SIGNIFICANT CHANGE UP (ref 8.4–10.5)
CHLORIDE SERPL-SCNC: 100 MMOL/L — SIGNIFICANT CHANGE UP (ref 96–108)
CO2 SERPL-SCNC: 26 MMOL/L — SIGNIFICANT CHANGE UP (ref 22–31)
CREAT SERPL-MCNC: 0.99 MG/DL — SIGNIFICANT CHANGE UP (ref 0.5–1.3)
CULTURE RESULTS: SIGNIFICANT CHANGE UP
GLUCOSE SERPL-MCNC: 119 MG/DL — HIGH (ref 70–99)
HCT VFR BLD CALC: 25.3 % — LOW (ref 39–50)
HGB BLD-MCNC: 8.1 G/DL — LOW (ref 13–17)
MAGNESIUM SERPL-MCNC: 2.1 MG/DL — SIGNIFICANT CHANGE UP (ref 1.6–2.6)
MCHC RBC-ENTMCNC: 28.9 PG — SIGNIFICANT CHANGE UP (ref 27–34)
MCHC RBC-ENTMCNC: 32 G/DL — SIGNIFICANT CHANGE UP (ref 32–36)
MCV RBC AUTO: 90.4 FL — SIGNIFICANT CHANGE UP (ref 80–100)
PHOSPHATE SERPL-MCNC: 2.9 MG/DL — SIGNIFICANT CHANGE UP (ref 2.5–4.5)
PLATELET # BLD AUTO: 814 K/UL — HIGH (ref 150–400)
POTASSIUM SERPL-MCNC: 4 MMOL/L — SIGNIFICANT CHANGE UP (ref 3.5–5.3)
POTASSIUM SERPL-SCNC: 4 MMOL/L — SIGNIFICANT CHANGE UP (ref 3.5–5.3)
RBC # BLD: 2.8 M/UL — LOW (ref 4.2–5.8)
RBC # FLD: 15.5 % — SIGNIFICANT CHANGE UP (ref 10.3–16.9)
SODIUM SERPL-SCNC: 138 MMOL/L — SIGNIFICANT CHANGE UP (ref 135–145)
SPECIMEN SOURCE: SIGNIFICANT CHANGE UP
WBC # BLD: 11.7 K/UL — HIGH (ref 3.8–10.5)
WBC # FLD AUTO: 11.7 K/UL — HIGH (ref 3.8–10.5)

## 2018-06-04 PROCEDURE — 99233 SBSQ HOSP IP/OBS HIGH 50: CPT

## 2018-06-04 PROCEDURE — 74177 CT ABD & PELVIS W/CONTRAST: CPT | Mod: 26

## 2018-06-04 RX ORDER — DIPHENHYDRAMINE HCL 50 MG
25 CAPSULE ORAL ONCE
Qty: 0 | Refills: 0 | Status: DISCONTINUED | OUTPATIENT
Start: 2018-06-04 | End: 2018-06-05

## 2018-06-04 RX ORDER — NEISSERIA MENINGITIDIS GROUP A CAPSULAR POLYSACCHARIDE TETANUS TOXOID CONJUGATE ANTIGEN, NEISSERIA MENINGITIDIS GROUP C CAPSULAR POLYSACCHARIDE TETANUS TOXOID CONJUGATE ANTIGEN, NEISSERIA MENINGITIDIS GROUP Y CAPSULAR POLYSACCHARIDE TETANUS TOXOID CONJUGATE ANTIGEN, AND NEISSERIA MENINGITIDIS GROUP W-135 CAPSULAR POLYSACCHARIDE TETANUS TOXOID CONJUGATE ANTIGEN 10; 10; 10; 10 UG/.5ML; UG/.5ML; UG/.5ML; UG/.5ML
0.5 INJECTION, SOLUTION INTRAMUSCULAR ONCE
Qty: 0 | Refills: 0 | Status: COMPLETED | OUTPATIENT
Start: 2018-06-04 | End: 2018-06-04

## 2018-06-04 RX ORDER — HAEMOPH B POLYSAC CONJ-MENING 7.5MCG/0.5
0.5 VIAL (ML) INTRAMUSCULAR ONCE
Qty: 0 | Refills: 0 | Status: COMPLETED | OUTPATIENT
Start: 2018-06-04 | End: 2018-06-04

## 2018-06-04 RX ORDER — SODIUM CHLORIDE 9 MG/ML
1000 INJECTION INTRAMUSCULAR; INTRAVENOUS; SUBCUTANEOUS
Qty: 0 | Refills: 0 | Status: DISCONTINUED | OUTPATIENT
Start: 2018-06-04 | End: 2018-06-05

## 2018-06-04 RX ORDER — DIATRIZOATE MEGLUMINE 180 MG/ML
30 INJECTION, SOLUTION INTRAVESICAL ONCE
Qty: 0 | Refills: 0 | Status: COMPLETED | OUTPATIENT
Start: 2018-06-04 | End: 2018-06-04

## 2018-06-04 RX ORDER — PNEUMOCOCCAL 13-VALENT CONJUGATE VACCINE 2.2; 2.2; 2.2; 2.2; 2.2; 4.4; 2.2; 2.2; 2.2; 2.2; 2.2; 2.2; 2.2 UG/.5ML; UG/.5ML; UG/.5ML; UG/.5ML; UG/.5ML; UG/.5ML; UG/.5ML; UG/.5ML; UG/.5ML; UG/.5ML; UG/.5ML; UG/.5ML; UG/.5ML
0.5 INJECTION, SUSPENSION INTRAMUSCULAR ONCE
Qty: 0 | Refills: 0 | Status: COMPLETED | OUTPATIENT
Start: 2018-06-04 | End: 2018-06-04

## 2018-06-04 RX ORDER — TETANUS TOXOID, REDUCED DIPHTHERIA TOXOID AND ACELLULAR PERTUSSIS VACCINE, ADSORBED 5; 2.5; 8; 8; 2.5 [IU]/.5ML; [IU]/.5ML; UG/.5ML; UG/.5ML; UG/.5ML
0.5 SUSPENSION INTRAMUSCULAR ONCE
Qty: 0 | Refills: 0 | Status: COMPLETED | OUTPATIENT
Start: 2018-06-04 | End: 2018-06-04

## 2018-06-04 RX ORDER — AMPICILLIN SODIUM AND SULBACTAM SODIUM 250; 125 MG/ML; MG/ML
3 INJECTION, POWDER, FOR SUSPENSION INTRAMUSCULAR; INTRAVENOUS EVERY 6 HOURS
Qty: 0 | Refills: 0 | Status: DISCONTINUED | OUTPATIENT
Start: 2018-06-04 | End: 2018-06-05

## 2018-06-04 RX ADMIN — HEPARIN SODIUM 5000 UNIT(S): 5000 INJECTION INTRAVENOUS; SUBCUTANEOUS at 14:55

## 2018-06-04 RX ADMIN — EMTRICITABINE, RILPIVIRINE HYDROCHLORIDE, AND TENOFOVIR DISOPROXIL FUMARATE 1 TABLET(S): 200; 25; 300 TABLET, FILM COATED ORAL at 19:27

## 2018-06-04 RX ADMIN — DOLUTEGRAVIR SODIUM 50 MILLIGRAM(S): 25 TABLET, FILM COATED ORAL at 14:55

## 2018-06-04 RX ADMIN — Medication 1 CAPSULE(S): at 18:53

## 2018-06-04 RX ADMIN — HEPARIN SODIUM 5000 UNIT(S): 5000 INJECTION INTRAVENOUS; SUBCUTANEOUS at 05:29

## 2018-06-04 RX ADMIN — Medication 100 MILLIGRAM(S): at 19:06

## 2018-06-04 RX ADMIN — PNEUMOCOCCAL 13-VALENT CONJUGATE VACCINE 0.5 MILLILITER(S): 2.2; 2.2; 2.2; 2.2; 2.2; 4.4; 2.2; 2.2; 2.2; 2.2; 2.2; 2.2; 2.2 INJECTION, SUSPENSION INTRAMUSCULAR at 18:56

## 2018-06-04 RX ADMIN — PIPERACILLIN AND TAZOBACTAM 200 GRAM(S): 4; .5 INJECTION, POWDER, LYOPHILIZED, FOR SOLUTION INTRAVENOUS at 05:28

## 2018-06-04 RX ADMIN — Medication 0.5 MILLILITER(S): at 22:30

## 2018-06-04 RX ADMIN — AMPICILLIN SODIUM AND SULBACTAM SODIUM 200 GRAM(S): 250; 125 INJECTION, POWDER, FOR SUSPENSION INTRAMUSCULAR; INTRAVENOUS at 19:27

## 2018-06-04 RX ADMIN — OXYCODONE AND ACETAMINOPHEN 2 TABLET(S): 5; 325 TABLET ORAL at 09:44

## 2018-06-04 RX ADMIN — HEPARIN SODIUM 5000 UNIT(S): 5000 INJECTION INTRAVENOUS; SUBCUTANEOUS at 22:27

## 2018-06-04 RX ADMIN — TETANUS TOXOID, REDUCED DIPHTHERIA TOXOID AND ACELLULAR PERTUSSIS VACCINE, ADSORBED 0.5 MILLILITER(S): 5; 2.5; 8; 8; 2.5 SUSPENSION INTRAMUSCULAR at 19:03

## 2018-06-04 RX ADMIN — Medication 1 CAPSULE(S): at 14:55

## 2018-06-04 RX ADMIN — OXYCODONE AND ACETAMINOPHEN 2 TABLET(S): 5; 325 TABLET ORAL at 18:53

## 2018-06-04 RX ADMIN — DIATRIZOATE MEGLUMINE 30 MILLILITER(S): 180 INJECTION, SOLUTION INTRAVESICAL at 09:34

## 2018-06-04 RX ADMIN — PIPERACILLIN AND TAZOBACTAM 200 GRAM(S): 4; .5 INJECTION, POWDER, LYOPHILIZED, FOR SOLUTION INTRAVENOUS at 12:18

## 2018-06-04 RX ADMIN — OXYCODONE AND ACETAMINOPHEN 2 TABLET(S): 5; 325 TABLET ORAL at 10:44

## 2018-06-04 RX ADMIN — NEISSERIA MENINGITIDIS GROUP A CAPSULAR POLYSACCHARIDE TETANUS TOXOID CONJUGATE ANTIGEN, NEISSERIA MENINGITIDIS GROUP C CAPSULAR POLYSACCHARIDE TETANUS TOXOID CONJUGATE ANTIGEN, NEISSERIA MENINGITIDIS GROUP Y CAPSULAR POLYSACCHARIDE TETANUS TOXOID CONJUGATE ANTIGEN, AND NEISSERIA MENINGITIDIS GROUP W-135 CAPSULAR POLYSACCHARIDE TETANUS TOXOID CONJUGATE ANTIGEN 0.5 MILLILITER(S): 10; 10; 10; 10 INJECTION, SOLUTION INTRAMUSCULAR at 22:27

## 2018-06-04 RX ADMIN — Medication 1 CAPSULE(S): at 07:59

## 2018-06-04 NOTE — PROGRESS NOTE ADULT - ASSESSMENT
73 yo M with pmhx of HIV, CD4 400-600, undetectable viral load, no OI, diastolic CHF, PVD, HTN and chronic pancreatitis with pancreatic insufficiency who presented w/ worsening sharp left back pain radiating to LUQ abdominal pain admitted for enlarging pancreatic pseudocyst , now s/p exlap, distal pancreatectomy and splenectomy, abdominal collection (+) E faecalis and Bacteroides thetaiotaomicron, LLQ abdominal pain

## 2018-06-04 NOTE — PROGRESS NOTE ADULT - SUBJECTIVE AND OBJECTIVE BOX
INTERVAL HPI/OVERNIGHT EVENTS: c/o more abdominal pain today, decreased VALERIA output, no fever    CONSTITUTIONAL:  Negative fever or chills,   EYES:  Negative  blurry vision or double vision  CARDIOVASCULAR:  Negative for chest pain or palpitations  RESPIRATORY:  Negative for cough, wheezing, or SOB   GASTROINTESTINAL:  Negative for nausea, vomiting, diarrhea, increased abdominal pain  GENITOURINARY:  Negative frequency, urgency or dysuria  NEUROLOGIC:  No headache, confusion, dizziness, lightheadedness      ANTIBIOTICS/RELEVANT:    MEDICATIONS  (STANDING):  albumin human  5% IVPB 1000 milliLiter(s) IV Intermittent once  amylase/lipase/protease  (CREON 36,000 Units) 1 Capsule(s) Oral three times a day with meals  docusate sodium 100 milliGRAM(s) Oral two times a day  dolutegravir 50 milliGRAM(s) Oral daily  emtricitabine 200 mG/rilpivirine 25 mG/tenofovir 300 mG 1 Tablet(s) Oral with dinner  heparin  Injectable 5000 Unit(s) SubCutaneous every 8 hours  piperacillin/tazobactam IVPB. 3.375 Gram(s) IV Intermittent every 6 hours  sodium chloride 0.9% with potassium chloride 20 mEq/L 1000 milliLiter(s) (100 mL/Hr) IV Continuous <Continuous>    MEDICATIONS  (PRN):  HYDROmorphone  Injectable 0.5 milliGRAM(s) IV Push every 4 hours PRN Moderate Pain (4 - 6)  ondansetron Injectable 4 milliGRAM(s) IV Push every 6 hours PRN Nausea  oxyCODONE    5 mG/acetaminophen 325 mG 1 Tablet(s) Oral every 4 hours PRN Moderate Pain (4 - 6)  oxyCODONE    5 mG/acetaminophen 325 mG 2 Tablet(s) Oral every 4 hours PRN Severe Pain (7 - 10)        Vital Signs Last 24 Hrs  T(C): 36.3 (04 Jun 2018 04:16), Max: 37.5 (03 Jun 2018 20:57)  T(F): 97.3 (04 Jun 2018 04:16), Max: 99.5 (03 Jun 2018 20:57)  HR: 93 (04 Jun 2018 04:16) (68 - 93)  BP: 152/79 (04 Jun 2018 04:16) (93/54 - 152/79)  BP(mean): --  RR: 16 (04 Jun 2018 04:16) (16 - 16)  SpO2: 97% (04 Jun 2018 04:16) (95% - 97%)    06-03-18 @ 07:01  -  06-04-18 @ 07:00  --------------------------------------------------------  IN: 2600 mL / OUT: 2210 mL / NET: 390 mL      PHYSICAL EXAM:  Constitutional: Well-developed, well nourished  Eyes:JAY, EOMI  Ear/Nose/Throat: no oral lesion, no sinus tenderness on percussion	  Neck:no JVD, no lymphadenopathy, supple  Respiratory: CTA ayo  Cardiovascular: S1S2 RRR, no murmurs  Gastrointestinal: LLQ tenderness, (+) BS,  incision with staples and in LLQ packed with gauze,   VALERIA in place draining small amount brownish fluid  Extremities: no e/e/c  Vascular: DP Pulse: right normal; left normal      LABS:                        8.1    11.7  )-----------( 814      ( 04 Jun 2018 06:05 )             25.3     06-04    138  |  100  |  4<L>  ----------------------------<  119<H>  4.0   |  26  |  0.99    Ca    8.6      04 Jun 2018 06:05  Phos  2.9     06-04  Mg     2.1     06-04      MICROBIOLOGY:  Culture - Other (05.31.18 @ 12:58)    Gram Stain:   Rare Gram positive cocci in pairs  Moderate White blood cells    -  Ampicillin: S <=2    -  Vancomycin: S 2    Specimen Source: .Other abdominal wound    Culture Results:   Few Enterococcus faecalis    Organism Identification: Enterococcus faecalis    Organism: Enterococcus faecalis    Method Type: EUGENE    Culture - Blood (05.28.18 @ 08:17)    Gram Stain:   Anaerobic Bottle: Gram Negative Rods  Result called to and read back byJovan Carballo RN 05/29/2018 13:32:13  ***Blood Panel PCR results on this specimen are available  approximately 3 hours after the Gram stain result.***  Gram stain, PCR, and/or culture results may not always  correspond due to difference in methodologies.  ************************************************************  This PCR assay was performed using [facility].  The following targets are tested for: Enterococcus,  vancomycin resistant enterococci, Listeria monocytogenes,  coagulase negative staphylococci, S. aureus,  methicillin resistant S. aureus, Streptococcus agalactiae  (Group B), S. pneumoniae, S. pyogenes (Group A),  Acinetobacter baumannii, Enterobacter cloacae, E. coli,  Klebsiella oxytoca, K. pneumoniae, Proteus sp.,  Serratia marcescens, Haemophilus influenzae,  Neisseria meningitidis, Pseudomonas aeruginosa, Candida  albicans, C. glabrata, C krusei, C parapsilosis,  C. tropicalis and the KPC resistance gene.  "Due to technical problems, Proteus sp. will Not be reported as part of  the BCID panel until further notice"    -  Amoxicillin/Clavulanic Acid: I 8    -  Clindamycin: R 16    -  Multidrug (KPC pos) resistant organism: Nondet    -  Staphylococcus aureus: Nondet    -  Methicillin resistant Staphylococcus aureus (MRSA): Nondet    -  Coagulase negative Staphylococcus: Nondet    -  Enterococcus species: Nondet    -  Vancomycin resistant Enterococcus sp.: Nondet    -  Escherichia coli: Nondet    -  Klebsiella oxytoca: Nondet    -  Klebsiella pneumoniae: Nondet    -  Serratia marcescens: Nondet    -  Proteus species: Nondet    -  Haemophilus influenzae: Nondet    -  Listeria monocytogenes: Nondet    -  Neisseria meningitidis: Nondet    -  Pseudomonas aeruginosa: Nondet    -  Acinetobacter baumanii: Nondet    -  Enterobacter cloacae complex: Nondet    -  Streptococcus sp. (Not Grp A, B or S pneumoniae): Nondet    -  Streptococcus agalactiae (Group B): Nondet    -  Streptococcus pyogenes (Group A): Nondet    -  Streptococcus pneumoniae: Nondet    -  Candida albicans: Nondet    -  Candida glabrata: Nondet    -  Candida krusei: Nondet    -  Candida parapsilosis: Nondet    -  Candida tropicalis: Nondet    Specimen Source: .Blood Blood    Organism: Bacteroides thetaiotaomicron    Organism: Blood Culture PCR    Culture Results:   Growth in anaerobic bottle: Bacteroides thetaiotaomicron Beta lactamase  positive    Organism Identification: Bacteroides thetaiotaomicron  Blood Culture PCR    Method Type: PCR    Method Type: ETEST        RADIOLOGY & ADDITIONAL STUDIES: INTERVAL HPI/OVERNIGHT EVENTS: c/o more abdominal pain today, decreased VALERIA output, no fever    CONSTITUTIONAL:  Negative fever or chills,   EYES:  Negative  blurry vision or double vision  CARDIOVASCULAR:  Negative for chest pain or palpitations  RESPIRATORY:  Negative for cough, wheezing, or SOB   GASTROINTESTINAL:  Negative for nausea, vomiting, diarrhea, increased abdominal pain  GENITOURINARY:  Negative frequency, urgency or dysuria  NEUROLOGIC:  No headache, confusion, dizziness, lightheadedness      ANTIBIOTICS/RELEVANT:    MEDICATIONS  (STANDING):  albumin human  5% IVPB 1000 milliLiter(s) IV Intermittent once  amylase/lipase/protease  (CREON 36,000 Units) 1 Capsule(s) Oral three times a day with meals  docusate sodium 100 milliGRAM(s) Oral two times a day  dolutegravir 50 milliGRAM(s) Oral daily  emtricitabine 200 mG/rilpivirine 25 mG/tenofovir 300 mG 1 Tablet(s) Oral with dinner  heparin  Injectable 5000 Unit(s) SubCutaneous every 8 hours  piperacillin/tazobactam IVPB. 3.375 Gram(s) IV Intermittent every 6 hours  sodium chloride 0.9% with potassium chloride 20 mEq/L 1000 milliLiter(s) (100 mL/Hr) IV Continuous <Continuous>    MEDICATIONS  (PRN):  HYDROmorphone  Injectable 0.5 milliGRAM(s) IV Push every 4 hours PRN Moderate Pain (4 - 6)  ondansetron Injectable 4 milliGRAM(s) IV Push every 6 hours PRN Nausea  oxyCODONE    5 mG/acetaminophen 325 mG 1 Tablet(s) Oral every 4 hours PRN Moderate Pain (4 - 6)  oxyCODONE    5 mG/acetaminophen 325 mG 2 Tablet(s) Oral every 4 hours PRN Severe Pain (7 - 10)        Vital Signs Last 24 Hrs  T(C): 36.3 (04 Jun 2018 04:16), Max: 37.5 (03 Jun 2018 20:57)  T(F): 97.3 (04 Jun 2018 04:16), Max: 99.5 (03 Jun 2018 20:57)  HR: 93 (04 Jun 2018 04:16) (68 - 93)  BP: 152/79 (04 Jun 2018 04:16) (93/54 - 152/79)  BP(mean): --  RR: 16 (04 Jun 2018 04:16) (16 - 16)  SpO2: 97% (04 Jun 2018 04:16) (95% - 97%)    06-03-18 @ 07:01  -  06-04-18 @ 07:00  --------------------------------------------------------  IN: 2600 mL / OUT: 2210 mL / NET: 390 mL      PHYSICAL EXAM:  Constitutional: Well-developed, well nourished  Eyes:JAY, EOMI  Ear/Nose/Throat: no oral lesion, no sinus tenderness on percussion	  Neck:no JVD, no lymphadenopathy, supple  Respiratory: CTA ayo  Cardiovascular: S1S2 RRR, no murmurs  Gastrointestinal: LLQ tenderness, (+) BS,  incision with staples and in LLQ packed with gauze,   VALERIA in place draining small amount brownish fluid  Extremities: no e/e/c  Vascular: DP Pulse: right normal; left normal      LABS:                        8.1    11.7  )-----------( 814      ( 04 Jun 2018 06:05 )             25.3     06-04    138  |  100  |  4<L>  ----------------------------<  119<H>  4.0   |  26  |  0.99    Ca    8.6      04 Jun 2018 06:05  Phos  2.9     06-04  Mg     2.1     06-04      MICROBIOLOGY:  Culture - Other (05.31.18 @ 12:58)    Gram Stain:   Rare Gram positive cocci in pairs  Moderate White blood cells    -  Ampicillin: S <=2    -  Vancomycin: S 2    Specimen Source: .Other abdominal wound    Culture Results:   Few Enterococcus faecalis    Organism Identification: Enterococcus faecalis    Organism: Enterococcus faecalis    Method Type: EUGENE    Culture - Blood (05.28.18 @ 08:17)    Gram Stain:   Anaerobic Bottle: Gram Negative Rods  Result called to and read back byJovan Carballo RN 05/29/2018 13:32:13  ***Blood Panel PCR results on this specimen are available  approximately 3 hours after the Gram stain result.***  Gram stain, PCR, and/or culture results may not always  correspond due to difference in methodologies.  ************************************************************  This PCR assay was performed using [facility].  The following targets are tested for: Enterococcus,  vancomycin resistant enterococci, Listeria monocytogenes,  coagulase negative staphylococci, S. aureus,  methicillin resistant S. aureus, Streptococcus agalactiae  (Group B), S. pneumoniae, S. pyogenes (Group A),  Acinetobacter baumannii, Enterobacter cloacae, E. coli,  Klebsiella oxytoca, K. pneumoniae, Proteus sp.,  Serratia marcescens, Haemophilus influenzae,  Neisseria meningitidis, Pseudomonas aeruginosa, Candida  albicans, C. glabrata, C krusei, C parapsilosis,  C. tropicalis and the KPC resistance gene.  "Due to technical problems, Proteus sp. will Not be reported as part of  the BCID panel until further notice"    -  Amoxicillin/Clavulanic Acid: I 8    -  Clindamycin: R 16    -  Multidrug (KPC pos) resistant organism: Nondet    -  Staphylococcus aureus: Nondet    -  Methicillin resistant Staphylococcus aureus (MRSA): Nondet    -  Coagulase negative Staphylococcus: Nondet    -  Enterococcus species: Nondet    -  Vancomycin resistant Enterococcus sp.: Nondet    -  Escherichia coli: Nondet    -  Klebsiella oxytoca: Nondet    -  Klebsiella pneumoniae: Nondet    -  Serratia marcescens: Nondet    -  Proteus species: Nondet    -  Haemophilus influenzae: Nondet    -  Listeria monocytogenes: Nondet    -  Neisseria meningitidis: Nondet    -  Pseudomonas aeruginosa: Nondet    -  Acinetobacter baumanii: Nondet    -  Enterobacter cloacae complex: Nondet    -  Streptococcus sp. (Not Grp A, B or S pneumoniae): Nondet    -  Streptococcus agalactiae (Group B): Nondet    -  Streptococcus pyogenes (Group A): Nondet    -  Streptococcus pneumoniae: Nondet    -  Candida albicans: Nondet    -  Candida glabrata: Nondet    -  Candida krusei: Nondet    -  Candida parapsilosis: Nondet    -  Candida tropicalis: Nondet    Specimen Source: .Blood Blood    Organism: Bacteroides thetaiotaomicron    Organism: Blood Culture PCR    Culture Results:   Growth in anaerobic bottle: Bacteroides thetaiotaomicron Beta lactamase  positive    Organism Identification: Bacteroides thetaiotaomicron  Blood Culture PCR    Method Type: PCR    Method Type: ETEST        RADIOLOGY & ADDITIONAL STUDIES:  < from: CT Abdomen and Pelvis w/ Oral Cont and w/ IV Cont (06.04.18 @ 14:32) >  IMPRESSION: Status post distal pancreatectomy, splenectomy, and   descending colon resection with primary anastomosis. Interval decrease in   size, and interval development of air within a collection in the bed of   the pancreatic tail. Redemonstration of a left paracolic gutter air-fluid   collection, similar in size as compared to 5/30/2018. There is   redemonstration of extravasation of ingested contrast within this   collection.

## 2018-06-04 NOTE — PROGRESS NOTE ADULT - SUBJECTIVE AND OBJECTIVE BOX
O/N: ARTHUR  6/3: ARTHUR O/N: ARTHUR  6/3: ARTHUR      SUBJECTIVE: Patient seen and examined bedside by surgery team. Patient complains of abdominal pain this morning. No nausea, vomiting, chills, or fevers overnight. Abdomen distended, worse tenderness to palpation in LLQ. No leukocytosis. Minimal drain output.     dolutegravir 50 milliGRAM(s) Oral daily  emtricitabine 200 mG/rilpivirine 25 mG/tenofovir 300 mG 1 Tablet(s) Oral with dinner  heparin  Injectable 5000 Unit(s) SubCutaneous every 8 hours  piperacillin/tazobactam IVPB. 3.375 Gram(s) IV Intermittent every 6 hours      Vital Signs Last 24 Hrs  T(C): 36.3 (04 Jun 2018 04:16), Max: 37.5 (03 Jun 2018 20:57)  T(F): 97.3 (04 Jun 2018 04:16), Max: 99.5 (03 Jun 2018 20:57)  HR: 93 (04 Jun 2018 04:16) (68 - 93)  BP: 152/79 (04 Jun 2018 04:16) (93/54 - 152/79)  BP(mean): --  RR: 16 (04 Jun 2018 04:16) (16 - 16)  SpO2: 97% (04 Jun 2018 04:16) (95% - 97%)  I&O's Detail    03 Jun 2018 07:01  -  04 Jun 2018 07:00  --------------------------------------------------------  IN:    sodium chloride 0.9% with potassium chloride 20 mEq/L: 2200 mL    Solution: 400 mL  Total IN: 2600 mL    OUT:    Bulb: 10 mL    Voided: 2200 mL  Total OUT: 2210 mL    Total NET: 390 mL            Physical Exam  General: in mild distress 2/2 abdominal pain  C/V: NSR. RRR  Pulm: Nonlabored breathing, no respiratory distress  Abd: distended, ttp in LLQ, no guarding, dressing changed at bedside. drain output minimal  Extrem: WWP, no edema, SCDs in place        LABS:                        8.1    11.7  )-----------( 814      ( 04 Jun 2018 06:05 )             25.3     06-04    138  |  100  |  4<L>  ----------------------------<  119<H>  4.0   |  26  |  0.99    Ca    8.6      04 Jun 2018 06:05  Phos  2.9     06-04  Mg     2.1     06-04            RADIOLOGY & ADDITIONAL STUDIES:
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
## 2018-06-04 NOTE — CHART NOTE - NSCHARTNOTEFT_GEN_A_CORE
Admitting Diagnosis:   Patient is a 72y old  Male who presents with a chief complaint of abd pain (22 May 2018 09:23)      PAST MEDICAL & SURGICAL HISTORY:  Asymptomatic human immunodeficiency virus infection: HIV (human immunodeficiency virus infection)  Chronic pancreatitis: Chronic pancreatitis  Cataract: Cataracts, bilateral      Current Nutrition Order:   Diet, NPO (06-04-18 @ 08:57)      PO Intake: Good (%) [   ]  Fair (50-75%) [   ] Poor (<25%) [   ]was eating 75% as per patient    GI Issues: abdominal pain.Denied N/V/D    Pain:yes    Skin Integrity:surgical incision    Labs:   06-04    138  |  100  |  4<L>  ----------------------------<  119<H>  4.0   |  26  |  0.99    Ca    8.6      04 Jun 2018 06:05  Phos  2.9     06-04  Mg     2.1     06-04      CAPILLARY BLOOD GLUCOSE          Medications:  MEDICATIONS  (STANDING):  albumin human  5% IVPB 1000 milliLiter(s) IV Intermittent once  amylase/lipase/protease  (CREON 36,000 Units) 1 Capsule(s) Oral three times a day with meals  docusate sodium 100 milliGRAM(s) Oral two times a day  dolutegravir 50 milliGRAM(s) Oral daily  emtricitabine 200 mG/rilpivirine 25 mG/tenofovir 300 mG 1 Tablet(s) Oral with dinner  heparin  Injectable 5000 Unit(s) SubCutaneous every 8 hours  piperacillin/tazobactam IVPB. 3.375 Gram(s) IV Intermittent every 6 hours  sodium chloride 0.9% with potassium chloride 20 mEq/L 1000 milliLiter(s) (100 mL/Hr) IV Continuous <Continuous>    MEDICATIONS  (PRN):  HYDROmorphone  Injectable 0.5 milliGRAM(s) IV Push every 4 hours PRN Moderate Pain (4 - 6)  ondansetron Injectable 4 milliGRAM(s) IV Push every 6 hours PRN Nausea  oxyCODONE    5 mG/acetaminophen 325 mG 1 Tablet(s) Oral every 4 hours PRN Moderate Pain (4 - 6)  oxyCODONE    5 mG/acetaminophen 325 mG 2 Tablet(s) Oral every 4 hours PRN Severe Pain (7 - 10)      Weight:96.2kg  Daily     Daily no updated weights    Weight Change: no updated weights    Estimated energy needs: Based on IBW:78kg(above 120% of IBW) [id number]:2130-2730kcal and 1.2-1.4gmprotein:94-109gmprotein and fluids:2130-2730cc    Subjective: 71 y/o HIV+ male admitted with abdominal pain from acute pancreatitis S/P exp.lap and distal pancreactomy/ splenectomy. As per patient still with abdominal pain.NPO for CT  Previous Nutrition Diagnosis: Increased nutrient needs r/t increased demand for kcal and nutrients AEB: HIV+    Active [  x ]  Resolved [   ]    If resolved, new PES:     Goal:meet 80% of needs consistently    Recommendations:1.Updated weights 2.Change diet to low fat    Education: brief review(patient going for CT)    Risk Level: High [   ] Moderate [ x  ] Low [   ]

## 2018-06-04 NOTE — PROGRESS NOTE ADULT - PROBLEM SELECTOR PLAN 2
S/p drainage, VALERIA drain intact draining brown fluid.  Management per primary team.  For his pain, continue Percocet Q4H PRN for moderate pain, 2x percocet Q4H PRN for severe pain, and 0.5mg IV Dilaudid for breakthrough. Would assess for somnolence prior to administer medication.  -Contine with Creon  Pending repeat CT abdomen/pelvis today

## 2018-06-04 NOTE — PROGRESS NOTE ADULT - SUBJECTIVE AND OBJECTIVE BOX
Pt examined at bedside.   Stated pain is still 10/10, also increased SOB non-related to pain, new per patient.    V/S    T(F): 99.5 (06-04-18 @ 11:07), Max: 99.5 (06-03-18 @ 20:57)  HR: 101 (06-04-18 @ 11:07)  BP: 120/70 (06-04-18 @ 11:07)  RR: 16 (06-04-18 @ 11:07)  SpO2: 95% (06-04-18 @ 11:07)  Wt(kg): --  PE     GEN - Appears stated age. Sitting on chair, comfortable           HEENT - NCAT, EOMI, PERRLA, MMM           CVS - RRR, no murmur           PULM - bibasilar crackles           ABD - Soft, tender to palpation on VALERIA drain site, draining dark brown fluid, no exudate           EXT - Distal extremities warm, no cyanosis, no edema.           NEURO - AOx3, Moves all extremities.     LAB                        8.1    11.7  )-----------( 814      ( 04 Jun 2018 06:05 )             25.3               06-04    138  |  100  |  4<L>  ----------------------------<  119<H>  4.0   |  26  |  0.99    Ca    8.6      04 Jun 2018 06:05  Phos  2.9     06-04  Mg     2.1     06-04                                Additional tests & radiology reviewed.

## 2018-06-04 NOTE — PROGRESS NOTE ADULT - PROBLEM SELECTOR PLAN 1
Pt is septic with bacteroides bacteremia and infected intraabdominal fluid collection/abscesses, now afebrile for 24 hours w/ improved leukocytosis.   Awaiting antibiotic susceptibilities and wound culture data - continue with zosyn for now, pt will likely need PICC line placement for 2 weeks of IV antibiotics for bacteremia and abscesses - plan as per ID Pt is septic with bacteroides bacteremia and enterococcus infected intraabdominal fluid collection/abscesses, now afebrile for days w/ improved leukocytosis.   Will need PICC line for d/c- will get unasyn 3g q6 per ID

## 2018-06-04 NOTE — PROGRESS NOTE ADULT - PROBLEM SELECTOR PLAN 4
pt remains normotensive off of BP medications, given active infection would not be aggressive with BP control.  Continue to monitor and once stable would plan to restart home HCTZ

## 2018-06-04 NOTE — CONSULT NOTE ADULT - SUBJECTIVE AND OBJECTIVE BOX
Surgery has requested a central venous catheter to accommodate medications. The history of chronic pancreatitis, pancreatic insufficiency, distal pancreatectomy and splenectomy, abdominal abscess and  sepsis is noted along with the cxr and data. On suitable int jugular or subclavian vein will be accessed with ultrasound guidance. The procedure and indications were explained to the pt for consent.

## 2018-06-04 NOTE — CONSULT NOTE ADULT - CONSULT REASON
Preoperative Clearance
central venous access
preoperative evaluation
Acute on chronic pancreatitis, enlarged pseudocyst
Chronic pancreatitis and infected pancreatic pseudocyst, s/p distal pancreatectomy and splenectomy

## 2018-06-04 NOTE — PROGRESS NOTE ADULT - PROBLEM SELECTOR PLAN 1
1) Continue Zosyn 3.375 IV q6h ,   2) CT Abdomen 2 small collection, possible abscess; per surgery no further drainage; per Surgery repeat CT prior to discharge. 1) d/c Zosyn and change to Unasyn 3gr IV q 8h x 2 weeks  2) CT Abdomen 2 small collection, stable; per surgery no further drainage; 1) d/c Zosyn and change to Unasyn 3gr IV q 6h x 2 weeks  2) CT Abdomen 2 small collection, stable; per surgery no further drainage;

## 2018-06-04 NOTE — PROGRESS NOTE ADULT - PROBLEM SELECTOR PLAN 3
Patient has increased SOB and bibasilar crackles on exam, could be developing pleural effusion.  Would stop IV fluid.  CXR.

## 2018-06-05 ENCOUNTER — TRANSCRIPTION ENCOUNTER (OUTPATIENT)
Age: 73
End: 2018-06-05

## 2018-06-05 VITALS
RESPIRATION RATE: 16 BRPM | HEART RATE: 84 BPM | TEMPERATURE: 99 F | DIASTOLIC BLOOD PRESSURE: 64 MMHG | SYSTOLIC BLOOD PRESSURE: 107 MMHG | OXYGEN SATURATION: 93 %

## 2018-06-05 LAB
ANION GAP SERPL CALC-SCNC: 14 MMOL/L — SIGNIFICANT CHANGE UP (ref 5–17)
BUN SERPL-MCNC: 5 MG/DL — LOW (ref 7–23)
CALCIUM SERPL-MCNC: 8.6 MG/DL — SIGNIFICANT CHANGE UP (ref 8.4–10.5)
CHLORIDE SERPL-SCNC: 101 MMOL/L — SIGNIFICANT CHANGE UP (ref 96–108)
CO2 SERPL-SCNC: 23 MMOL/L — SIGNIFICANT CHANGE UP (ref 22–31)
CREAT SERPL-MCNC: 0.99 MG/DL — SIGNIFICANT CHANGE UP (ref 0.5–1.3)
GLUCOSE SERPL-MCNC: 111 MG/DL — HIGH (ref 70–99)
HCT VFR BLD CALC: 26.1 % — LOW (ref 39–50)
HGB BLD-MCNC: 8.3 G/DL — LOW (ref 13–17)
MAGNESIUM SERPL-MCNC: 2 MG/DL — SIGNIFICANT CHANGE UP (ref 1.6–2.6)
MCHC RBC-ENTMCNC: 29.4 PG — SIGNIFICANT CHANGE UP (ref 27–34)
MCHC RBC-ENTMCNC: 31.8 G/DL — LOW (ref 32–36)
MCV RBC AUTO: 92.6 FL — SIGNIFICANT CHANGE UP (ref 80–100)
PHOSPHATE SERPL-MCNC: 3.1 MG/DL — SIGNIFICANT CHANGE UP (ref 2.5–4.5)
PLATELET # BLD AUTO: 781 K/UL — HIGH (ref 150–400)
POTASSIUM SERPL-MCNC: 4.6 MMOL/L — SIGNIFICANT CHANGE UP (ref 3.5–5.3)
POTASSIUM SERPL-SCNC: 4.6 MMOL/L — SIGNIFICANT CHANGE UP (ref 3.5–5.3)
RBC # BLD: 2.82 M/UL — LOW (ref 4.2–5.8)
RBC # FLD: 15.4 % — SIGNIFICANT CHANGE UP (ref 10.3–16.9)
SODIUM SERPL-SCNC: 138 MMOL/L — SIGNIFICANT CHANGE UP (ref 135–145)
WBC # BLD: 11.4 K/UL — HIGH (ref 3.8–10.5)
WBC # FLD AUTO: 11.4 K/UL — HIGH (ref 3.8–10.5)

## 2018-06-05 PROCEDURE — 82330 ASSAY OF CALCIUM: CPT

## 2018-06-05 PROCEDURE — 80048 BASIC METABOLIC PNL TOTAL CA: CPT

## 2018-06-05 PROCEDURE — 90670 PCV13 VACCINE IM: CPT

## 2018-06-05 PROCEDURE — 87086 URINE CULTURE/COLONY COUNT: CPT

## 2018-06-05 PROCEDURE — 90715 TDAP VACCINE 7 YRS/> IM: CPT

## 2018-06-05 PROCEDURE — 85670 THROMBIN TIME PLASMA: CPT

## 2018-06-05 PROCEDURE — 99152 MOD SED SAME PHYS/QHP 5/>YRS: CPT

## 2018-06-05 PROCEDURE — 83735 ASSAY OF MAGNESIUM: CPT

## 2018-06-05 PROCEDURE — 85027 COMPLETE CBC AUTOMATED: CPT

## 2018-06-05 PROCEDURE — 90734 MENACWYD/MENACWYCRM VACC IM: CPT

## 2018-06-05 PROCEDURE — 87150 DNA/RNA AMPLIFIED PROBE: CPT

## 2018-06-05 PROCEDURE — 87186 SC STD MICRODIL/AGAR DIL: CPT

## 2018-06-05 PROCEDURE — 99285 EMERGENCY DEPT VISIT HI MDM: CPT | Mod: 25

## 2018-06-05 PROCEDURE — 10160 PNXR ASPIR ABSC HMTMA BULLA: CPT

## 2018-06-05 PROCEDURE — 85730 THROMBOPLASTIN TIME PARTIAL: CPT

## 2018-06-05 PROCEDURE — P9045: CPT

## 2018-06-05 PROCEDURE — 83690 ASSAY OF LIPASE: CPT

## 2018-06-05 PROCEDURE — 85610 PROTHROMBIN TIME: CPT

## 2018-06-05 PROCEDURE — 85025 COMPLETE CBC W/AUTO DIFF WBC: CPT

## 2018-06-05 PROCEDURE — C1769: CPT

## 2018-06-05 PROCEDURE — 87040 BLOOD CULTURE FOR BACTERIA: CPT

## 2018-06-05 PROCEDURE — 96374 THER/PROPH/DIAG INJ IV PUSH: CPT | Mod: XU

## 2018-06-05 PROCEDURE — 86850 RBC ANTIBODY SCREEN: CPT

## 2018-06-05 PROCEDURE — 85018 HEMOGLOBIN: CPT

## 2018-06-05 PROCEDURE — 85611 PROTHROMBIN TEST: CPT

## 2018-06-05 PROCEDURE — 36415 COLL VENOUS BLD VENIPUNCTURE: CPT

## 2018-06-05 PROCEDURE — 87075 CULTR BACTERIA EXCEPT BLOOD: CPT

## 2018-06-05 PROCEDURE — 93005 ELECTROCARDIOGRAM TRACING: CPT

## 2018-06-05 PROCEDURE — 71045 X-RAY EXAM CHEST 1 VIEW: CPT | Mod: 26

## 2018-06-05 PROCEDURE — 96375 TX/PRO/DX INJ NEW DRUG ADDON: CPT

## 2018-06-05 PROCEDURE — 84484 ASSAY OF TROPONIN QUANT: CPT

## 2018-06-05 PROCEDURE — 87536 HIV-1 QUANT&REVRSE TRNSCRPJ: CPT

## 2018-06-05 PROCEDURE — 80053 COMPREHEN METABOLIC PANEL: CPT

## 2018-06-05 PROCEDURE — 90686 IIV4 VACC NO PRSV 0.5 ML IM: CPT

## 2018-06-05 PROCEDURE — 97162 PT EVAL MOD COMPLEX 30 MIN: CPT

## 2018-06-05 PROCEDURE — 93306 TTE W/DOPPLER COMPLETE: CPT

## 2018-06-05 PROCEDURE — 74177 CT ABD & PELVIS W/CONTRAST: CPT

## 2018-06-05 PROCEDURE — 87070 CULTURE OTHR SPECIMN AEROBIC: CPT

## 2018-06-05 PROCEDURE — 82962 GLUCOSE BLOOD TEST: CPT

## 2018-06-05 PROCEDURE — 86900 BLOOD TYPING SEROLOGIC ABO: CPT

## 2018-06-05 PROCEDURE — 88309 TISSUE EXAM BY PATHOLOGIST: CPT

## 2018-06-05 PROCEDURE — 86923 COMPATIBILITY TEST ELECTRIC: CPT

## 2018-06-05 PROCEDURE — 90647 HIB PRP-OMP VACC 3 DOSE IM: CPT

## 2018-06-05 PROCEDURE — 86901 BLOOD TYPING SEROLOGIC RH(D): CPT

## 2018-06-05 PROCEDURE — 84132 ASSAY OF SERUM POTASSIUM: CPT

## 2018-06-05 PROCEDURE — 84295 ASSAY OF SERUM SODIUM: CPT

## 2018-06-05 PROCEDURE — 71045 X-RAY EXAM CHEST 1 VIEW: CPT

## 2018-06-05 PROCEDURE — 84100 ASSAY OF PHOSPHORUS: CPT

## 2018-06-05 PROCEDURE — 85732 THROMBOPLASTIN TIME PARTIAL: CPT

## 2018-06-05 PROCEDURE — 97116 GAIT TRAINING THERAPY: CPT

## 2018-06-05 PROCEDURE — 81001 URINALYSIS AUTO W/SCOPE: CPT

## 2018-06-05 PROCEDURE — C1729: CPT

## 2018-06-05 RX ORDER — AMPICILLIN SODIUM AND SULBACTAM SODIUM 250; 125 MG/ML; MG/ML
3 INJECTION, POWDER, FOR SUSPENSION INTRAMUSCULAR; INTRAVENOUS
Qty: 0 | Refills: 0 | COMMUNITY
Start: 2018-06-05 | End: 2018-06-19

## 2018-06-05 RX ORDER — LIPASE/PROTEASE/AMYLASE 16-48-48K
1 CAPSULE,DELAYED RELEASE (ENTERIC COATED) ORAL
Qty: 0 | Refills: 0 | COMMUNITY
Start: 2018-06-05

## 2018-06-05 RX ORDER — ONDANSETRON 8 MG/1
0 TABLET, FILM COATED ORAL
Qty: 0 | Refills: 0 | COMMUNITY
Start: 2018-06-05

## 2018-06-05 RX ADMIN — OXYCODONE AND ACETAMINOPHEN 2 TABLET(S): 5; 325 TABLET ORAL at 12:53

## 2018-06-05 RX ADMIN — OXYCODONE AND ACETAMINOPHEN 2 TABLET(S): 5; 325 TABLET ORAL at 13:22

## 2018-06-05 RX ADMIN — OXYCODONE AND ACETAMINOPHEN 2 TABLET(S): 5; 325 TABLET ORAL at 01:56

## 2018-06-05 RX ADMIN — DOLUTEGRAVIR SODIUM 50 MILLIGRAM(S): 25 TABLET, FILM COATED ORAL at 12:53

## 2018-06-05 RX ADMIN — AMPICILLIN SODIUM AND SULBACTAM SODIUM 200 GRAM(S): 250; 125 INJECTION, POWDER, FOR SUSPENSION INTRAMUSCULAR; INTRAVENOUS at 12:52

## 2018-06-05 RX ADMIN — AMPICILLIN SODIUM AND SULBACTAM SODIUM 200 GRAM(S): 250; 125 INJECTION, POWDER, FOR SUSPENSION INTRAMUSCULAR; INTRAVENOUS at 06:08

## 2018-06-05 RX ADMIN — OXYCODONE AND ACETAMINOPHEN 2 TABLET(S): 5; 325 TABLET ORAL at 02:58

## 2018-06-05 RX ADMIN — Medication 100 UNIT(S): at 12:52

## 2018-06-05 RX ADMIN — HEPARIN SODIUM 5000 UNIT(S): 5000 INJECTION INTRAVENOUS; SUBCUTANEOUS at 05:06

## 2018-06-05 RX ADMIN — HEPARIN SODIUM 5000 UNIT(S): 5000 INJECTION INTRAVENOUS; SUBCUTANEOUS at 12:52

## 2018-06-05 RX ADMIN — AMPICILLIN SODIUM AND SULBACTAM SODIUM 200 GRAM(S): 250; 125 INJECTION, POWDER, FOR SUSPENSION INTRAMUSCULAR; INTRAVENOUS at 00:41

## 2018-06-05 RX ADMIN — Medication 1 CAPSULE(S): at 12:53

## 2018-06-05 RX ADMIN — Medication 100 MILLIGRAM(S): at 05:06

## 2018-06-05 RX ADMIN — Medication 1 CAPSULE(S): at 08:00

## 2018-06-05 NOTE — PROGRESS NOTE ADULT - PROBLEM SELECTOR PLAN 1
1) d/c Zosyn and change to Unasyn 3gr IV q 6h x 2 weeks  2) CT Abdomen 2 small collection, stable; per surgery no further drainage;

## 2018-06-05 NOTE — DISCHARGE NOTE ADULT - PLAN OF CARE
Please follow up with Dr. Roa in 1-2 weeks for management of your antibiotic regimen and HIV. Call the number below to schedule an appointment.    IV antibiotics for 2 weeks. PICC line is in place Follow up and recovery Please follow up with Dr. Starr in 1-2 weeks. Call the number below to schedule an appointment.     General Discharge Instructions:  Please resume all regular home medications unless specifically advised not to take a particular medication. Also, please take any new medications as prescribed.  Please get plenty of rest, continue to ambulate several times per day, and drink adequate amounts of fluids. Avoid lifting weights greater than 5-10 lbs until you follow-up with your surgeon, who will instruct you further regarding activity restrictions.  Avoid driving or operating heavy machinery while taking pain medications.  Please follow-up with your surgeon and Primary Care Provider (PCP) as advised.  Incision Care:  *Please call your doctor or nurse practitioner if you have increased pain, swelling, redness, or drainage from the incision site.  *Avoid swimming and baths until your follow-up appointment.  *You may shower, and wash surgical incisions with a mild soap and warm water. Gently pat the area dry.  *If you have staples, they will be removed at your follow-up appointment.  *If you have steri-strips, they will fall off on their own. Please remove any remaining strips 7-10 days after surgery. Drain care VALERIA Drain Care:  *Please look at the site every day for signs of infection (increased redness or pain, swelling, odor, yellow or bloody discharge, warm to touch, fever).  *Maintain suction of the bulb.  *Note color, consistency, and amount of fluid in the drain. Call the doctor, nurse practitioner, or VNA nurse if the amount increases significantly or changes in character.  *Be sure to empty the drain frequently. Record the output, if instructed to do so.  *You may shower; wash the area gently with warm, soapy water.  *Keep the insertion site clean and dry otherwise.  *Avoid swimming, baths, hot tubs; do not submerge yourself in water.  *Make sure to keep the drain attached securely to your body to prevent pulling or dislocation. Wound care BID Wet-to-Dry daily dressing changes:   - carefully remove gauze from wound  - if gauze is sticking to skin, wet it with warm water and loosen it  - discard old gauze dressing  - rinse wound with water and gently pat dry  - check the wound for increased redness, swelling, purulence, or bad odor  - squeeze some saline solution onto gauze dressing and carefully use the wet portion of the dressing to cover the wound bed  - carefully pack wound bed and cover packed gauze with abdominal pad  - use paper tape to secure dressing in place Follow up with Dr. Roa (Infectious Disease)

## 2018-06-05 NOTE — PROGRESS NOTE ADULT - PROVIDER SPECIALTY LIST ADULT
Hospitalist
Infectious Disease
Internal Medicine
Surgery
Internal Medicine

## 2018-06-05 NOTE — PROGRESS NOTE ADULT - SUBJECTIVE AND OBJECTIVE BOX
Pt examined at bedside.   Stated pain is controlled, no n/v, no fever/chill    V/S    T(F): 99 (06-05-18 @ 14:17), Max: 100.2 (06-04-18 @ 17:11)  HR: 84 (06-05-18 @ 14:17)  BP: 107/64 (06-05-18 @ 14:17)  RR: 16 (06-05-18 @ 14:17)  SpO2: 93% (06-05-18 @ 14:17)  Wt(kg): --  PE     GEN - Appears stated age. No distress.           HEENT - NCAT, EOMI, PERRLA, MMM           CVS - RRR, no M/R/G, no JVD           PULM - CTA B/L, equal air entry, no increased work of breathing           ABD - Soft, TTP on VALERIA drain site           EXT - Distal extremities warm, no cyanosis, no edema.           NEURO - AOx3, Moves all extremities.     LAB                        8.3    11.4  )-----------( 781      ( 05 Jun 2018 06:06 )             26.1               06-05    138  |  101  |  5<L>  ----------------------------<  111<H>  4.6   |  23  |  0.99    Ca    8.6      05 Jun 2018 06:06  Phos  3.1     06-05  Mg     2.0     06-05                                Additional tests & radiology reviewed.

## 2018-06-05 NOTE — DISCHARGE NOTE ADULT - CARE PROVIDER_API CALL
Corey Starr), Surgery  [address] [place] [address]  Phone: [phone]  Fax: [phone]    Whitney Roa), Infectious Disease; Internal Medicine  [address] [place] [address]  Phone: [phone]  Fax: [phone]

## 2018-06-05 NOTE — PROGRESS NOTE ADULT - PROBLEM SELECTOR PROBLEM 2
Pseudocyst of pancreas
Chronic systolic congestive heart failure, NYHA class 3
HIV (human immunodeficiency virus infection)
Intraabdominal fluid collection
Pseudocyst of pancreas
Pseudocyst of pancreas
Fever, low grade
Fever, low grade
Pseudocyst of pancreas
Pseudocyst of pancreas
Pancreatitis

## 2018-06-05 NOTE — PROGRESS NOTE ADULT - SUBJECTIVE AND OBJECTIVE BOX
O/N: Dr. Whaley was consulted to put PICC line, PICC line placed and can be used, ARTHUR  6/4: CT a/p; no collections; drain output    POD 5/23: exploratory laparotomy, lysis of adhesions, distal pancreatectomy, splenectomy and segmental colon resection with primary anastomosis: creation of aditi-en-y with jejunal serosal patch of distal pancreas. O/N: Dr. Whaley was consulted to put PICC line, PICC line placed and can be used, ARTHUR  6/4: CT a/p; no collections; drain output    POD 5/23: exploratory laparotomy, lysis of adhesions, distal pancreatectomy, splenectomy and segmental colon resection with primary anastomosis: creation of aditi-en-y with jejunal serosal patch of distal pancreas.      SUBJECTIVE: Patient seen and examined bedside by surgery team. Persistent LUQ pain. No change from yesterday. Has been ambulating. Dressing changed at bedside. PICC placed last night. Waiting for insurance auth.         ampicillin/sulbactam  IVPB 3 Gram(s) IV Intermittent every 6 hours  dolutegravir 50 milliGRAM(s) Oral daily  emtricitabine 200 mG/rilpivirine 25 mG/tenofovir 300 mG 1 Tablet(s) Oral with dinner  heparin  flush 100 Units/mL Injectable 100 Unit(s) IV Push every other day  heparin  Injectable 5000 Unit(s) SubCutaneous every 8 hours      Vital Signs Last 24 Hrs  T(C): 37.1 (05 Jun 2018 05:09), Max: 37.9 (04 Jun 2018 17:11)  T(F): 98.8 (05 Jun 2018 05:09), Max: 100.2 (04 Jun 2018 17:11)  HR: 109 (05 Jun 2018 05:09) (74 - 109)  BP: 136/77 (05 Jun 2018 05:09) (120/70 - 148/71)  BP(mean): --  RR: 18 (05 Jun 2018 05:09) (15 - 18)  SpO2: 100% (05 Jun 2018 05:09) (93% - 100%)  I&O's Detail    04 Jun 2018 07:01  -  05 Jun 2018 07:00  --------------------------------------------------------  IN:    Oral Fluid: 1000 mL    sodium chloride 0.9% with potassium chloride 20 mEq/L: 600 mL    Solution: 100 mL  Total IN: 1700 mL    OUT:    Bulb: 20 mL    Voided: 1000 mL  Total OUT: 1020 mL    Total NET: 680 mL            Physical Exam  General: NAD, alert, interactive, appears comfortable  C/V: NSR  Pulm: Nonlabored breathing, no respiratory distress  Abd: softly distended, NT/ND.  Extrem: WWP, no edema, SCDs in place        LABS:                        8.3    11.4  )-----------( 781      ( 05 Jun 2018 06:06 )             26.1     06-05    138  |  101  |  5<L>  ----------------------------<  111<H>  4.6   |  23  |  0.99    Ca    8.6      05 Jun 2018 06:06  Phos  3.1     06-05  Mg     2.0     06-05            RADIOLOGY & ADDITIONAL STUDIES: O/N: Dr. Whaley was consulted to put PICC line, PICC line placed and can be used, ARTHUR  6/4: CT a/p; no collections; drain output    POD 5/23: exploratory laparotomy, lysis of adhesions, distal pancreatectomy, splenectomy and segmental colon resection with primary anastomosis: creation of aditi-en-y with jejunal serosal patch of distal pancreas.      SUBJECTIVE: Patient seen and examined bedside by surgery team. Persistent LUQ pain. No change from yesterday. Has been ambulating. Dressing changed at bedside. PICC placed last night. Waiting for insurance auth.         ampicillin/sulbactam  IVPB 3 Gram(s) IV Intermittent every 6 hours  dolutegravir 50 milliGRAM(s) Oral daily  emtricitabine 200 mG/rilpivirine 25 mG/tenofovir 300 mG 1 Tablet(s) Oral with dinner  heparin  flush 100 Units/mL Injectable 100 Unit(s) IV Push every other day  heparin  Injectable 5000 Unit(s) SubCutaneous every 8 hours      Vital Signs Last 24 Hrs  T(C): 37.1 (05 Jun 2018 05:09), Max: 37.9 (04 Jun 2018 17:11)  T(F): 98.8 (05 Jun 2018 05:09), Max: 100.2 (04 Jun 2018 17:11)  HR: 109 (05 Jun 2018 05:09) (74 - 109)  BP: 136/77 (05 Jun 2018 05:09) (120/70 - 148/71)  BP(mean): --  RR: 18 (05 Jun 2018 05:09) (15 - 18)  SpO2: 100% (05 Jun 2018 05:09) (93% - 100%)  I&O's Detail    04 Jun 2018 07:01  -  05 Jun 2018 07:00  --------------------------------------------------------  IN:    Oral Fluid: 1000 mL    sodium chloride 0.9% with potassium chloride 20 mEq/L: 600 mL    Solution: 100 mL  Total IN: 1700 mL    OUT:    Bulb: 20 mL    Voided: 1000 mL  Total OUT: 1020 mL    Total NET: 680 mL              Physical Exam  General: in mild distress 2/2 abdominal pain  C/V: NSR. RRR  Pulm: Nonlabored breathing, no respiratory distress  Abd: distended, ttp in LLQ, no guarding, dressing changed at bedside. drain output minimal  Extrem: WWP, no edema, SCDs in place        LABS:                        8.3    11.4  )-----------( 781      ( 05 Jun 2018 06:06 )             26.1     06-05    138  |  101  |  5<L>  ----------------------------<  111<H>  4.6   |  23  |  0.99    Ca    8.6      05 Jun 2018 06:06  Phos  3.1     06-05  Mg     2.0     06-05            RADIOLOGY & ADDITIONAL STUDIES:

## 2018-06-05 NOTE — PROGRESS NOTE ADULT - PROBLEM SELECTOR PLAN 3
4) Please give Adacel, Meningococcal, Pneumococcal and  Haemophilus B vaccine ASAP since patient s/p splenectomy; tolerated 2 vaccines yesterday , please give 2 mote today prior to discharge

## 2018-06-05 NOTE — PROGRESS NOTE ADULT - ATTENDING COMMENTS
Patient seen and examined with house-staff during bedside rounds.  Resident note read, including vitals, physical findings, laboratory data, and radiological reports.   Revisions included below.  Direct personal management at bed side and extensive interpretation of the data.  Plan was outlined and discussed in details with the housestaff.  Decision making of high complexity  Action taken for acute disease activity to reflect the level of care provided:  - medication reconciliation  - review laboratory data
72 M with hx of HIV, chronic pancreatitis now s/p surgical management of pseudocyst with mild fever this AM, asymptomatic, tolerating PO  #fever - otherwise stable VS,  decreasing white count, no localizing symptoms to point to infections outside of possible surgical site  #HIV - continue current regimen  #pancreatic insufficiency - would start creon when pt is on full or regular diet  otherwise can continue current course   will continue to monitor
73 yo M with hx of HIV, pancreatitis, now s/p surgical management of pancreatic pseudocyst with fever of unclear etiology  - CT Abd pending, Xray negative for evidence of PNA and UA clear.  Would consider BCx given pt reporting chills  - continue HIV medication  - continue creon with diet  - will continue to monitor
Pt seen and examined, VS reviewed, agree with plan per Dr Ley.
Pt seen and examined, VS reviewed and agree with assessment above as d/w surgical team.
Pt seen and examined, VS reviewed, exam as above, labs and imaging reviewed, plan d/w Dr Ley and modified above.
Pt seen and examined, VSS, exam as above, agree with plan as outlined by Dr Ley.
Pt seen and examined, agree with plan as above as d/w pt and Dr Alexandre.
Pt seen and examined,   agree with above  - consider starting creon when pt's diet is advanced  - monitor FSG given partial pancreatectomy  - otherwise as above
Pt seen and examined, agree with assessment and plan as outlined above.
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
## 2018-06-05 NOTE — PROGRESS NOTE ADULT - PROBLEM SELECTOR PLAN 1
Pt is septic with bacteroides bacteremia and enterococcus infected intraabdominal fluid collection/abscesses, now afebrile for days w/ improved leukocytosis.   Will need PICC line for d/c- will get unasyn 3g q6 per ID

## 2018-06-05 NOTE — PROGRESS NOTE ADULT - SUBJECTIVE AND OBJECTIVE BOX
INTERVAL HPI/OVERNIGHT EVENTS: Sleeping comfortably this morning    Changed to Unasyn IV yesterday, tolerates well    ANTIBIOTICS/RELEVANT:    MEDICATIONS  (STANDING):  ampicillin/sulbactam  IVPB 3 Gram(s) IV Intermittent every 6 hours  amylase/lipase/protease  (CREON 36,000 Units) 1 Capsule(s) Oral three times a day with meals  diphenhydrAMINE   Injectable 25 milliGRAM(s) IV Push once  docusate sodium 100 milliGRAM(s) Oral two times a day  dolutegravir 50 milliGRAM(s) Oral daily  emtricitabine 200 mG/rilpivirine 25 mG/tenofovir 300 mG 1 Tablet(s) Oral with dinner  heparin  flush 100 Units/mL Injectable 100 Unit(s) IV Push every other day  heparin  Injectable 5000 Unit(s) SubCutaneous every 8 hours  sodium chloride 0.9% with potassium chloride 20 mEq/L 1000 milliLiter(s) (100 mL/Hr) IV Continuous <Continuous>  sodium chloride 0.9%. 1000 milliLiter(s) (50 mL/Hr) IV Continuous <Continuous>    MEDICATIONS  (PRN):  HYDROmorphone  Injectable 0.5 milliGRAM(s) IV Push every 4 hours PRN Moderate Pain (4 - 6)  ondansetron Injectable 4 milliGRAM(s) IV Push every 6 hours PRN Nausea  oxyCODONE    5 mG/acetaminophen 325 mG 1 Tablet(s) Oral every 4 hours PRN Moderate Pain (4 - 6)  oxyCODONE    5 mG/acetaminophen 325 mG 2 Tablet(s) Oral every 4 hours PRN Severe Pain (7 - 10)        Vital Signs Last 24 Hrs  T(C): 37.2 (05 Jun 2018 09:05), Max: 37.9 (04 Jun 2018 17:11)  T(F): 99 (05 Jun 2018 09:05), Max: 100.2 (04 Jun 2018 17:11)  HR: 91 (05 Jun 2018 09:05) (74 - 109)  BP: 113/61 (05 Jun 2018 09:05) (113/61 - 148/71)  BP(mean): --  RR: 17 (05 Jun 2018 09:05) (15 - 18)  SpO2: 93% (05 Jun 2018 09:05) (93% - 100%)    06-04-18 @ 07:01  -  06-05-18 @ 07:00  --------------------------------------------------------  IN: 1800 mL / OUT: 1020 mL / NET: 780 mL    06-05-18 @ 07:01  -  06-05-18 @ 10:43  --------------------------------------------------------  IN: 0 mL / OUT: 400 mL / NET: -400 mL      PHYSICAL EXAM:  Constitutional: Well-developed, well nourished  Eyes:JAY, EOMI  Ear/Nose/Throat: no oral lesion, no sinus tenderness on percussion	  Neck:no JVD, no lymphadenopathy, supple  Respiratory: CTA ayo  Cardiovascular: S1S2 RRR, no murmurs  Gastrointestinal: LLQ tenderness, (+) BS,  incision with staples and  VALERIA in place draining small amount brownish fluid  Extremities:no e/e/c  Vascular: DP Pulse:	right normal; left normal      LABS:                        8.3    11.4  )-----------( 781      ( 05 Jun 2018 06:06 )             26.1     06-05    138  |  101  |  5<L>  ----------------------------<  111<H>  4.6   |  23  |  0.99    Ca    8.6      05 Jun 2018 06:06  Phos  3.1     06-05  Mg     2.0     06-05            MICROBIOLOGY:  Culture - Other (05.31.18 @ 12:58)    Gram Stain:   Rare Gram positive cocci in pairs  Moderate White blood cells    -  Ampicillin: S <=2    -  Vancomycin: S 2    Specimen Source: .Other abdominal wound    Culture Results:   Few Enterococcus faecalis    Organism Identification: Enterococcus faecalis    Organism: Enterococcus faecalis    Method Type: EUGENE        RADIOLOGY & ADDITIONAL STUDIES:

## 2018-06-05 NOTE — DISCHARGE NOTE ADULT - PATIENT PORTAL LINK FT
You can access the [email or web address] Patient Portal, offered by [facility], by registering with the following website: [email or web address]

## 2018-06-05 NOTE — PROGRESS NOTE ADULT - PROBLEM SELECTOR PROBLEM 1
Fever
Fever
Abscess of abdominal cavity
Bacteroides infection
Fever
Fever
HTN (hypertension) with goal to be determined
Intraabdominal fluid collection
HTN (hypertension) with goal to be determined
Fever
HTN (hypertension) with goal to be determined
Fever
Fever
Abscess of abdominal cavity

## 2018-06-05 NOTE — DISCHARGE NOTE ADULT - CARE PLAN
Principal Discharge DX:	Pancreatitis  Goal:	Follow up and recovery  Assessment and plan of treatment:	Please follow up with Dr. Starr in 1-2 weeks. Call the number below to schedule an appointment.     General Discharge Instructions:  Please resume all regular home medications unless specifically advised not to take a particular medication. Also, please take any new medications as prescribed.  Please get plenty of rest, continue to ambulate several times per day, and drink adequate amounts of fluids. Avoid lifting weights greater than 5-10 lbs until you follow-up with your surgeon, who will instruct you further regarding activity restrictions.  Avoid driving or operating heavy machinery while taking pain medications.  Please follow-up with your surgeon and Primary Care Provider (PCP) as advised.  Incision Care:  *Please call your doctor or nurse practitioner if you have increased pain, swelling, redness, or drainage from the incision site.  *Avoid swimming and baths until your follow-up appointment.  *You may shower, and wash surgical incisions with a mild soap and warm water. Gently pat the area dry.  *If you have staples, they will be removed at your follow-up appointment.  *If you have steri-strips, they will fall off on their own. Please remove any remaining strips 7-10 days after surgery.  Goal:	Drain care  Assessment and plan of treatment:	VALERIA Drain Care:  *Please look at the site every day for signs of infection (increased redness or pain, swelling, odor, yellow or bloody discharge, warm to touch, fever).  *Maintain suction of the bulb.  *Note color, consistency, and amount of fluid in the drain. Call the doctor, nurse practitioner, or VNA nurse if the amount increases significantly or changes in character.  *Be sure to empty the drain frequently. Record the output, if instructed to do so.  *You may shower; wash the area gently with warm, soapy water.  *Keep the insertion site clean and dry otherwise.  *Avoid swimming, baths, hot tubs; do not submerge yourself in water.  *Make sure to keep the drain attached securely to your body to prevent pulling or dislocation.  Goal:	Wound care BID  Assessment and plan of treatment:	Wet-to-Dry daily dressing changes:   - carefully remove gauze from wound  - if gauze is sticking to skin, wet it with warm water and loosen it  - discard old gauze dressing  - rinse wound with water and gently pat dry  - check the wound for increased redness, swelling, purulence, or bad odor  - squeeze some saline solution onto gauze dressing and carefully use the wet portion of the dressing to cover the wound bed  - carefully pack wound bed and cover packed gauze with abdominal pad  - use paper tape to secure dressing in place  Goal:	Follow up with Dr. Roa (Infectious Disease)  Assessment and plan of treatment:	Please follow up with Dr. Roa in 1-2 weeks for management of your antibiotic regimen and HIV. Call the number below to schedule an appointment.    IV antibiotics for 2 weeks. PICC line is in place
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
## 2018-06-05 NOTE — DISCHARGE NOTE ADULT - MEDICATION SUMMARY - MEDICATIONS TO TAKE
I will START or STAY ON the medications listed below when I get home from the hospital:    Percocet 5/325 oral tablet  -- 2 tab(s) by mouth every 4 hours, As needed, Severe Pain (7 - 10)  -- Indication: For Severe pain    ondansetron 2 mg/mL injectable solution  --  injectable   -- Indication: For nausea    atorvastatin 10 mg oral tablet  -- 1 tab(s) by mouth once a day  -- Indication: For Hld    Complera oral tablet  -- 1 tab(s) by mouth once a day  -- Indication: For HIV (human immunodeficiency virus infection)    Tivicay 50 mg oral tablet  -- 1 tab(s) by mouth once a day  -- Indication: For HIV (human immunodeficiency virus infection)    Creon 36,000 units oral delayed release capsule  -- 1 cap(s) by mouth 3 times a day (with meals)  -- Indication: For Pancreatic insufficiency    Creon 36,000 units oral delayed release capsule  -- 1 cap(s) by mouth 3 times a day  -- Indication: For Pancreatic insufficiency    hydroCHLOROthiazide 25 mg oral tablet  -- 1 tab(s) by mouth once a day  -- Indication: For HTN (hypertension) with goal to be determined    docusate sodium 100 mg oral tablet  -- 3 tab(s) by mouth 2 times a day  -- Indication: For Constipation    ampicillin-sulbactam  -- 3 gram(s) intravenous every 6 hours    To be taken for 2 weeks; end date 5/19/2018   -- Indication: For Infection

## 2018-06-12 DIAGNOSIS — Z21 ASYMPTOMATIC HUMAN IMMUNODEFICIENCY VIRUS [HIV] INFECTION STATUS: ICD-10-CM

## 2018-06-12 DIAGNOSIS — E87.2 ACIDOSIS: ICD-10-CM

## 2018-06-12 DIAGNOSIS — R10.13 EPIGASTRIC PAIN: ICD-10-CM

## 2018-06-12 DIAGNOSIS — I50.22 CHRONIC SYSTOLIC (CONGESTIVE) HEART FAILURE: ICD-10-CM

## 2018-06-12 DIAGNOSIS — K86.3 PSEUDOCYST OF PANCREAS: ICD-10-CM

## 2018-06-12 DIAGNOSIS — H26.9 UNSPECIFIED CATARACT: ICD-10-CM

## 2018-06-12 DIAGNOSIS — K85.80 OTHER ACUTE PANCREATITIS WITHOUT NECROSIS OR INFECTION: ICD-10-CM

## 2018-06-12 DIAGNOSIS — Z87.828 PERSONAL HISTORY OF OTHER (HEALED) PHYSICAL INJURY AND TRAUMA: ICD-10-CM

## 2018-06-12 DIAGNOSIS — E78.5 HYPERLIPIDEMIA, UNSPECIFIED: ICD-10-CM

## 2018-06-12 DIAGNOSIS — I10 ESSENTIAL (PRIMARY) HYPERTENSION: ICD-10-CM

## 2018-08-24 ENCOUNTER — OTHER (OUTPATIENT)
Age: 73
End: 2018-08-24

## 2018-08-28 ENCOUNTER — APPOINTMENT (OUTPATIENT)
Dept: ENDOCRINOLOGY | Facility: CLINIC | Age: 73
End: 2018-08-28

## 2018-08-30 ENCOUNTER — APPOINTMENT (OUTPATIENT)
Dept: ENDOCRINOLOGY | Facility: CLINIC | Age: 73
End: 2018-08-30
Payer: COMMERCIAL

## 2018-08-30 VITALS
HEIGHT: 71 IN | DIASTOLIC BLOOD PRESSURE: 68 MMHG | HEART RATE: 94 BPM | SYSTOLIC BLOOD PRESSURE: 125 MMHG | BODY MASS INDEX: 25.06 KG/M2 | WEIGHT: 179 LBS

## 2018-08-30 LAB — GLUCOSE BLDC GLUCOMTR-MCNC: 231

## 2018-08-30 PROCEDURE — 99204 OFFICE O/P NEW MOD 45 MIN: CPT | Mod: 25

## 2018-08-30 PROCEDURE — 82962 GLUCOSE BLOOD TEST: CPT

## 2018-08-30 RX ORDER — ATORVASTATIN CALCIUM 10 MG/1
10 TABLET, FILM COATED ORAL
Refills: 0 | Status: ACTIVE | COMMUNITY

## 2018-08-30 RX ORDER — LOSARTAN POTASSIUM 100 MG/1
TABLET, FILM COATED ORAL
Refills: 0 | Status: ACTIVE | COMMUNITY

## 2018-10-03 ENCOUNTER — APPOINTMENT (OUTPATIENT)
Dept: SPINE | Facility: CLINIC | Age: 73
End: 2018-10-03

## 2018-10-04 ENCOUNTER — APPOINTMENT (OUTPATIENT)
Dept: ENDOCRINOLOGY | Facility: CLINIC | Age: 73
End: 2018-10-04
Payer: COMMERCIAL

## 2018-10-04 VITALS
HEART RATE: 77 BPM | SYSTOLIC BLOOD PRESSURE: 125 MMHG | BODY MASS INDEX: 26.08 KG/M2 | DIASTOLIC BLOOD PRESSURE: 65 MMHG | WEIGHT: 187 LBS

## 2018-10-04 LAB
GLUCOSE BLDC GLUCOMTR-MCNC: 97
HBA1C MFR BLD HPLC: 9.2

## 2018-10-04 PROCEDURE — 82962 GLUCOSE BLOOD TEST: CPT

## 2018-10-04 PROCEDURE — 83036 HEMOGLOBIN GLYCOSYLATED A1C: CPT | Mod: QW

## 2018-10-04 PROCEDURE — 99214 OFFICE O/P EST MOD 30 MIN: CPT | Mod: 25

## 2018-10-04 RX ORDER — INSULIN DEGLUDEC INJECTION 100 U/ML
100 INJECTION, SOLUTION SUBCUTANEOUS
Refills: 0 | Status: DISCONTINUED | COMMUNITY
End: 2018-10-04

## 2018-10-25 ENCOUNTER — APPOINTMENT (OUTPATIENT)
Dept: ENDOCRINOLOGY | Facility: CLINIC | Age: 73
End: 2018-10-25
Payer: COMMERCIAL

## 2018-10-25 VITALS
HEIGHT: 71 IN | BODY MASS INDEX: 26.6 KG/M2 | WEIGHT: 190 LBS | HEART RATE: 76 BPM | SYSTOLIC BLOOD PRESSURE: 136 MMHG | DIASTOLIC BLOOD PRESSURE: 79 MMHG

## 2018-10-25 PROCEDURE — 36415 COLL VENOUS BLD VENIPUNCTURE: CPT

## 2018-10-25 PROCEDURE — 99214 OFFICE O/P EST MOD 30 MIN: CPT | Mod: 25

## 2018-10-26 LAB
ALBUMIN SERPL ELPH-MCNC: 4.6 G/DL
ALP BLD-CCNC: 139 U/L
ALT SERPL-CCNC: 14 U/L
AMYLASE/CREAT SERPL: 127 U/L
ANION GAP SERPL CALC-SCNC: 15 MMOL/L
AST SERPL-CCNC: 17 U/L
BILIRUB SERPL-MCNC: 0.6 MG/DL
BUN SERPL-MCNC: 13 MG/DL
CALCIUM SERPL-MCNC: 9.5 MG/DL
CHLORIDE SERPL-SCNC: 100 MMOL/L
CHOLEST SERPL-MCNC: 119 MG/DL
CHOLEST/HDLC SERPL: 2.3 RATIO
CO2 SERPL-SCNC: 28 MMOL/L
CREAT SERPL-MCNC: 1.06 MG/DL
CREAT SPEC-SCNC: 50 MG/DL
FRUCTOSAMINE SERPL-MCNC: 301 UMOL/L
GLUCOSE SERPL-MCNC: 140 MG/DL
HBA1C MFR BLD HPLC: 8.8 %
HDLC SERPL-MCNC: 52 MG/DL
LDLC SERPL CALC-MCNC: 57 MG/DL
LPL SERPL-CCNC: 33 U/L
MICROALBUMIN 24H UR DL<=1MG/L-MCNC: <1.2 MG/DL
MICROALBUMIN/CREAT 24H UR-RTO: NORMAL
POTASSIUM SERPL-SCNC: 4.2 MMOL/L
PROT SERPL-MCNC: 7.6 G/DL
SODIUM SERPL-SCNC: 143 MMOL/L
TRIGL SERPL-MCNC: 50 MG/DL
TSH SERPL-ACNC: 1.4 UIU/ML

## 2018-12-06 ENCOUNTER — APPOINTMENT (OUTPATIENT)
Dept: ENDOCRINOLOGY | Facility: CLINIC | Age: 73
End: 2018-12-06
Payer: COMMERCIAL

## 2018-12-06 VITALS
HEART RATE: 63 BPM | SYSTOLIC BLOOD PRESSURE: 123 MMHG | DIASTOLIC BLOOD PRESSURE: 60 MMHG | BODY MASS INDEX: 25.62 KG/M2 | WEIGHT: 183 LBS | HEIGHT: 71 IN

## 2018-12-06 LAB
GLUCOSE BLDC GLUCOMTR-MCNC: 112
HBA1C MFR BLD HPLC: 7

## 2018-12-06 PROCEDURE — 83036 HEMOGLOBIN GLYCOSYLATED A1C: CPT | Mod: QW

## 2018-12-06 PROCEDURE — 99214 OFFICE O/P EST MOD 30 MIN: CPT | Mod: 25

## 2018-12-06 PROCEDURE — 82962 GLUCOSE BLOOD TEST: CPT

## 2019-02-21 ENCOUNTER — APPOINTMENT (OUTPATIENT)
Dept: ENDOCRINOLOGY | Facility: CLINIC | Age: 74
End: 2019-02-21

## 2019-03-07 ENCOUNTER — APPOINTMENT (OUTPATIENT)
Dept: ENDOCRINOLOGY | Facility: CLINIC | Age: 74
End: 2019-03-07
Payer: COMMERCIAL

## 2019-03-07 VITALS
WEIGHT: 183 LBS | SYSTOLIC BLOOD PRESSURE: 127 MMHG | DIASTOLIC BLOOD PRESSURE: 75 MMHG | BODY MASS INDEX: 25.52 KG/M2 | HEART RATE: 70 BPM

## 2019-03-07 LAB
GLUCOSE BLDC GLUCOMTR-MCNC: 115
HBA1C MFR BLD HPLC: 6.9

## 2019-03-07 PROCEDURE — 82962 GLUCOSE BLOOD TEST: CPT

## 2019-03-07 PROCEDURE — 99214 OFFICE O/P EST MOD 30 MIN: CPT | Mod: 25

## 2019-03-07 PROCEDURE — 83036 HEMOGLOBIN GLYCOSYLATED A1C: CPT | Mod: QW

## 2019-03-07 RX ORDER — BLOOD-GLUCOSE METER
70 EACH MISCELLANEOUS
Qty: 100 | Refills: 0 | Status: DISCONTINUED | COMMUNITY
Start: 2018-08-22

## 2019-03-07 RX ORDER — BLOOD-GLUCOSE METER
EACH MISCELLANEOUS
Qty: 200 | Refills: 0 | Status: ACTIVE | COMMUNITY
Start: 2018-08-22

## 2019-03-07 NOTE — ASSESSMENT
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   I re-explained to him the rationale for medical therapy and how metformin, like exercise, increases insulin sensitivity.  He declines medication at this time, feels that he can do better with his efforts at diet and exercise, and prefers to refrain from Rx right now and recheck A1C at next visit.  States that if at that time, A1C is not at goal with lifestyle interventions, he will proceed with starting metformin.  Offered to print him information about the medication but he declines.\par He will restart rigorous exercise regimen.\par Discussed with him that at some point, insulin reserve may be inadequate to control blood sugars.  I asked him to call me if he is noting rise in blood sugars, namely if noting more readings above 150.\par -On baby asa\par -on statin\par -on ARB\par -RTC 3 months

## 2019-03-07 NOTE — HISTORY OF PRESENT ILLNESS
[FreeTextEntry1] : 73 year old M with history of HIV, chronic pancreatitis S/p distal pancreatectomy 5/23/18 here for follow up of insulin dependent diabetes mellitus.  Per inpatient notes "5/23: exploratory laparotomy, lysis of adhesions, distal pancreatectomy, splenectomy and segmental colon resection with primary anastomosis: creation of aditi-en-y with jejunal serosal patch of distal pancreas. 19 Fr mj left at pancreatic resection bed."  Inpatient for 2 weeks and discharged to rehab.  No dx of DM in hospital.  Not discharged on DM meds.\par \par He reports that he followed up with his PCP after surgery and was told he had pancreatic insufficiency and would require insulin.\par He started insulin degludec 15 units nightly on 8/24/18.  He stopped insulin 9/1/18 approximately and has controlled his blood sugar with diet and exercise.  Previously he was working out for about 2 hrs 5/7 days per week.  Walks 20 mins (1 mile) on treadmill, eliptocal for four miles, rests then does arm pull downs, then back on treadmill for ten minutes (half mile).  [place] about 400 kCal.   Works out in the evenings\par Often he will check his blood sugar, and, if elevated, will walk around the block until he is satisfied with BG.\par More recently has not been exercising as much as has not been strict about diet (eating less homemade soup and more bread with either sardines, tuna salad or chopped liver).\par \par COMPLICATIONS OF DIABETES:\par Eye disease in the past: No\par 	Last eye exam:\par Nephropathy:  No\par Peripheral neuropathy: Has numbness and tingling due to chronic back issues\par 	History of foot sores/infections\par 	History of amputations\par Autonomic neuropathy:  No\par Macrovascular disease:  \par 	Takes a daily aspirin:  Yes\par \par CURRENT DIABETES TREATMENT\par diet and exercise \par \par DIET/EXERCISE HISTORY:\par Has met with dietician: Yes\par Exercise:  Yes, as above\par Weight change:  weight recently stable\par \par Hypoglycemic awareness: n/a\par Frequency of lows:  none\par \par Reviewed meter\par 7d average 128, 14 d average 129, 30d average is 135, tends to check 1-2 times daily at varying times\par Review of past 5d:\par Before breakfast 120, 102, 138\par After breakfast 121, 117, 146\par Before lunch 117\par Before dinner 121\par After dinner 104, 115\par Bedtime 109, 115, 138

## 2019-03-07 NOTE — DATA REVIEWED
[FreeTextEntry1] : Data previously reviewed is summarized below\par 8/21/18\par A1C 10.2%\par Total cholesterol 127\par HDL 37\par LDL 71\par \par glucose 431\par Na 132/ K 4.4\par BUN 20, Cr 1.07, eGFR 81\par Hgb 12.4

## 2019-07-09 ENCOUNTER — APPOINTMENT (OUTPATIENT)
Dept: ENDOCRINOLOGY | Facility: CLINIC | Age: 74
End: 2019-07-09
Payer: COMMERCIAL

## 2019-07-09 VITALS
HEIGHT: 71 IN | SYSTOLIC BLOOD PRESSURE: 106 MMHG | WEIGHT: 177 LBS | HEART RATE: 53 BPM | BODY MASS INDEX: 24.78 KG/M2 | DIASTOLIC BLOOD PRESSURE: 54 MMHG

## 2019-07-09 LAB
GLUCOSE BLDC GLUCOMTR-MCNC: 92
HBA1C MFR BLD HPLC: 6.4

## 2019-07-09 PROCEDURE — 83036 HEMOGLOBIN GLYCOSYLATED A1C: CPT | Mod: QW

## 2019-07-09 PROCEDURE — 82962 GLUCOSE BLOOD TEST: CPT

## 2019-07-09 PROCEDURE — 99214 OFFICE O/P EST MOD 30 MIN: CPT | Mod: 25

## 2019-07-09 NOTE — ASSESSMENT
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  Discussed diet as well\par Discussed with him that at some point, insulin reserve may be inadequate to control blood sugars.  I asked him to call me if he is noting rise in blood sugars, namely if noting more readings above 150.\par -On baby asa\par -on statin\par -on ARB\par -RTC 6 months
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
## 2019-08-20 ENCOUNTER — TRANSCRIPTION ENCOUNTER (OUTPATIENT)
Age: 74
End: 2019-08-20

## 2019-10-03 ENCOUNTER — OTHER (OUTPATIENT)
Age: 74
End: 2019-10-03

## 2019-10-03 ENCOUNTER — APPOINTMENT (OUTPATIENT)
Dept: ENDOCRINOLOGY | Facility: CLINIC | Age: 74
End: 2019-10-03
Payer: COMMERCIAL

## 2019-10-03 VITALS
HEART RATE: 61 BPM | DIASTOLIC BLOOD PRESSURE: 64 MMHG | HEIGHT: 71 IN | WEIGHT: 185 LBS | SYSTOLIC BLOOD PRESSURE: 123 MMHG | BODY MASS INDEX: 25.9 KG/M2

## 2019-10-03 DIAGNOSIS — Z90.410 POSTPROCEDURAL HYPOINSULINEMIA: ICD-10-CM

## 2019-10-03 DIAGNOSIS — E89.1 POSTPROCEDURAL HYPOINSULINEMIA: ICD-10-CM

## 2019-10-03 DIAGNOSIS — E13.9 POSTPROCEDURAL HYPOINSULINEMIA: ICD-10-CM

## 2019-10-03 LAB
GLUCOSE BLDC GLUCOMTR-MCNC: 123
HBA1C MFR BLD HPLC: 6.8

## 2019-10-03 PROCEDURE — 99214 OFFICE O/P EST MOD 30 MIN: CPT | Mod: 25

## 2019-10-03 PROCEDURE — 83036 HEMOGLOBIN GLYCOSYLATED A1C: CPT | Mod: QW

## 2019-10-03 PROCEDURE — 82962 GLUCOSE BLOOD TEST: CPT

## 2019-10-03 NOTE — ASSESSMENT
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  This is a bit higher than prior\par Previously recommended metformin however patient declines as long as A1C is at goal with exercise alone\par Continue rigorous exercise regimen.  Discussed diet as well.  He is sometimes "splurging" and will work on reducing frequency of this\par Previously Discussed with him that at some point, insulin reserve may be inadequate to control blood sugars.  I asked him to call me if he is noting rise in blood sugars, namely if noting more readings above 150.\par -On baby asa\par -on statin\par -on ARB.  Microalbumin is negative\par -RTC 3 months

## 2019-10-03 NOTE — DATA REVIEWED
[FreeTextEntry1] : Today I personally reviewed results of labs performed 9/16/19\par  mg/dL\par BUN 17, Cr 1.03, GFR 84\par A1C 6.8%, urine microalb/cr <0.2\par \par Data previously reviewed is summarized below\par 8/21/18\par A1C 10.2%\par Total cholesterol 127\par HDL 37\par LDL 71\par \par glucose 431\par Na 132/ K 4.4\par BUN 20, Cr 1.07, eGFR 81\par Hgb 12.4
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
## 2020-02-18 ENCOUNTER — TRANSCRIPTION ENCOUNTER (OUTPATIENT)
Age: 75
End: 2020-02-18

## 2020-02-18 RX ORDER — BLOOD SUGAR DIAGNOSTIC
STRIP MISCELLANEOUS
Qty: 150 | Refills: 5 | Status: ACTIVE | COMMUNITY
Start: 2018-08-22 | End: 1900-01-01

## 2020-02-18 RX ORDER — 70%ISOPROPYL ALCOHOL 0.7 ML/ML
70 SWAB TOPICAL
Qty: 100 | Refills: 5 | Status: ACTIVE | COMMUNITY
Start: 2020-02-18 | End: 1900-01-01

## 2021-07-19 ENCOUNTER — TRANSCRIPTION ENCOUNTER (OUTPATIENT)
Age: 76
End: 2021-07-19

## 2021-12-22 NOTE — ED ADULT NURSE NOTE - ED COMFORT CARE
Impression: Dry eye syndrome of bilateral lacrimal glands: H04.123. 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 Patient informed no

## 2022-04-26 NOTE — PATIENT PROFILE ADULT. - EXTENSIONS OF SELF_ADULT
Eyeglasses Bill For Surgical Tray: no Billing Type: Third-Party Bill Expected Date Of Service: 02/22/2022

## 2022-07-22 NOTE — PHYSICAL THERAPY INITIAL EVALUATION ADULT - PERSONAL SAFETY AND JUDGMENT, REHAB EVAL
intact Closure 2 Information: This tab is for additional flaps and grafts, including complex repair and grafts and complex repair and flaps. You can also specify a different location for the additional defect, if the location is the same you do not need to select a new one. We will insert the automated text for the repair you select below just as we do for solitary flaps and grafts. Please note that at this time if you select a location with a different insurance zone you will need to override the ICD10 and CPT if appropriate.

## 2022-11-02 NOTE — PROVIDER CONTACT NOTE (CHANGE IN STATUS NOTIFICATION) - BACKGROUND
s/p5/24 explore lap lysis of adhesion, distaln pancreatectomy splenectomy segmentalcolon resection primary anastomosis creation aditi en y jejunal serosal patch of distal pancreas 19 mj left atv
2 = A lot of assistance
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
